# Patient Record
Sex: MALE | Race: WHITE | NOT HISPANIC OR LATINO | Employment: OTHER | URBAN - METROPOLITAN AREA
[De-identification: names, ages, dates, MRNs, and addresses within clinical notes are randomized per-mention and may not be internally consistent; named-entity substitution may affect disease eponyms.]

---

## 2017-08-04 ENCOUNTER — RECORDS - HEALTHEAST (OUTPATIENT)
Dept: LAB | Facility: CLINIC | Age: 67
End: 2017-08-04

## 2017-08-04 LAB
CHOLEST SERPL-MCNC: 214 MG/DL
FASTING STATUS PATIENT QL REPORTED: YES
HDLC SERPL-MCNC: 53 MG/DL
LDLC SERPL CALC-MCNC: 142 MG/DL
TRIGL SERPL-MCNC: 97 MG/DL

## 2018-07-24 ENCOUNTER — RECORDS - HEALTHEAST (OUTPATIENT)
Dept: LAB | Facility: CLINIC | Age: 68
End: 2018-07-24

## 2018-07-24 LAB
ALBUMIN SERPL-MCNC: 3.8 G/DL (ref 3.5–5)
ALP SERPL-CCNC: 75 U/L (ref 45–120)
ALT SERPL W P-5'-P-CCNC: 24 U/L (ref 0–45)
ANION GAP SERPL CALCULATED.3IONS-SCNC: 8 MMOL/L (ref 5–18)
AST SERPL W P-5'-P-CCNC: 27 U/L (ref 0–40)
BILIRUB SERPL-MCNC: 0.7 MG/DL (ref 0–1)
BUN SERPL-MCNC: 20 MG/DL (ref 8–22)
CALCIUM SERPL-MCNC: 9.1 MG/DL (ref 8.5–10.5)
CHLORIDE BLD-SCNC: 108 MMOL/L (ref 98–107)
CHOLEST SERPL-MCNC: 212 MG/DL
CO2 SERPL-SCNC: 28 MMOL/L (ref 22–31)
CREAT SERPL-MCNC: 1.04 MG/DL (ref 0.7–1.3)
FASTING STATUS PATIENT QL REPORTED: YES
GFR SERPL CREATININE-BSD FRML MDRD: >60 ML/MIN/1.73M2
GLUCOSE BLD-MCNC: 99 MG/DL (ref 70–125)
HDLC SERPL-MCNC: 58 MG/DL
LDLC SERPL CALC-MCNC: 139 MG/DL
POTASSIUM BLD-SCNC: 4 MMOL/L (ref 3.5–5)
PROT SERPL-MCNC: 6.4 G/DL (ref 6–8)
SODIUM SERPL-SCNC: 144 MMOL/L (ref 136–145)
TRIGL SERPL-MCNC: 75 MG/DL

## 2019-09-05 ENCOUNTER — HOSPITAL ENCOUNTER (INPATIENT)
Facility: CLINIC | Age: 69
LOS: 3 days | Discharge: ANOTHER HOSPITAL | DRG: 871 | End: 2019-09-08
Attending: EMERGENCY MEDICINE | Admitting: INTERNAL MEDICINE
Payer: MEDICARE

## 2019-09-05 DIAGNOSIS — Z91.89 AT HIGH RISK FOR INFECTION: ICD-10-CM

## 2019-09-05 DIAGNOSIS — M62.81 GENERALIZED MUSCLE WEAKNESS: ICD-10-CM

## 2019-09-05 DIAGNOSIS — R78.81 GRAM-NEGATIVE BACTEREMIA: Primary | ICD-10-CM

## 2019-09-05 DIAGNOSIS — Z95.0 CARDIAC PACEMAKER IN SITU: ICD-10-CM

## 2019-09-05 DIAGNOSIS — R50.9 FEBRILE ILLNESS, ACUTE: ICD-10-CM

## 2019-09-05 DIAGNOSIS — A41.01 MSSA (METHICILLIN SUSCEPTIBLE STAPHYLOCOCCUS AUREUS) SEPTICEMIA (H): ICD-10-CM

## 2019-09-05 DIAGNOSIS — R11.0 NAUSEA: ICD-10-CM

## 2019-09-05 DIAGNOSIS — R50.9 ACUTE FEBRILE ILLNESS: ICD-10-CM

## 2019-09-05 DIAGNOSIS — I33.9 ACUTE ENDOCARDITIS, UNSPECIFIED ENDOCARDITIS TYPE: ICD-10-CM

## 2019-09-05 DIAGNOSIS — R52 PAIN: ICD-10-CM

## 2019-09-05 PROBLEM — E86.0 DEHYDRATION: Status: ACTIVE | Noted: 2019-09-05

## 2019-09-05 PROBLEM — D69.6 THROMBOCYTOPENIA (H): Status: ACTIVE | Noted: 2019-09-05

## 2019-09-05 PROBLEM — M62.82 RHABDOMYOLYSIS: Status: ACTIVE | Noted: 2019-09-05

## 2019-09-05 PROBLEM — E87.1 HYPONATREMIA: Status: ACTIVE | Noted: 2019-09-05

## 2019-09-05 PROBLEM — E87.6 HYPOKALEMIA: Status: ACTIVE | Noted: 2019-09-05

## 2019-09-05 LAB
ALBUMIN SERPL-MCNC: 2.7 G/DL (ref 3.4–5)
ALP SERPL-CCNC: 53 U/L (ref 40–150)
ALT SERPL W P-5'-P-CCNC: 52 U/L (ref 0–70)
ANION GAP SERPL CALCULATED.3IONS-SCNC: 8 MMOL/L (ref 3–14)
AST SERPL W P-5'-P-CCNC: 79 U/L (ref 0–45)
BASOPHILS # BLD AUTO: 0 10E9/L (ref 0–0.2)
BASOPHILS NFR BLD AUTO: 0 %
BILIRUB SERPL-MCNC: 1.4 MG/DL (ref 0.2–1.3)
BUN SERPL-MCNC: 25 MG/DL (ref 7–30)
CALCIUM SERPL-MCNC: 8 MG/DL (ref 8.5–10.1)
CHLORIDE SERPL-SCNC: 91 MMOL/L (ref 94–109)
CK SERPL-CCNC: 1106 U/L (ref 30–300)
CO2 SERPL-SCNC: 28 MMOL/L (ref 20–32)
CREAT SERPL-MCNC: 1.18 MG/DL (ref 0.66–1.25)
CRP SERPL-MCNC: 173 MG/L (ref 0–8)
DIFFERENTIAL METHOD BLD: ABNORMAL
EOSINOPHIL # BLD AUTO: 0 10E9/L (ref 0–0.7)
EOSINOPHIL NFR BLD AUTO: 0 %
ERYTHROCYTE [DISTWIDTH] IN BLOOD BY AUTOMATED COUNT: 12.2 % (ref 10–15)
ETHANOL SERPL-MCNC: <0.01 G/DL
GFR SERPL CREATININE-BSD FRML MDRD: 63 ML/MIN/{1.73_M2}
GLUCOSE SERPL-MCNC: 123 MG/DL (ref 70–99)
HCT VFR BLD AUTO: 35.7 % (ref 40–53)
HGB BLD-MCNC: 12.2 G/DL (ref 13.3–17.7)
LYMPHOCYTES # BLD AUTO: 0.2 10E9/L (ref 0.8–5.3)
LYMPHOCYTES NFR BLD AUTO: 3 %
MAGNESIUM SERPL-MCNC: 1.5 MG/DL (ref 1.6–2.3)
MCH RBC QN AUTO: 31.4 PG (ref 26.5–33)
MCHC RBC AUTO-ENTMCNC: 34.2 G/DL (ref 31.5–36.5)
MCV RBC AUTO: 92 FL (ref 78–100)
METAMYELOCYTES # BLD: 1.1 10E9/L
METAMYELOCYTES NFR BLD MANUAL: 14 %
MONOCYTES # BLD AUTO: 0.2 10E9/L (ref 0–1.3)
MONOCYTES NFR BLD AUTO: 2 %
NEUTROPHILS # BLD AUTO: 6.4 10E9/L (ref 1.6–8.3)
NEUTROPHILS NFR BLD AUTO: 81 %
PLATELET # BLD AUTO: 65 10E9/L (ref 150–450)
PLATELET # BLD EST: ABNORMAL 10*3/UL
POTASSIUM SERPL-SCNC: 3.1 MMOL/L (ref 3.4–5.3)
PROT SERPL-MCNC: 6 G/DL (ref 6.8–8.8)
RBC # BLD AUTO: 3.89 10E12/L (ref 4.4–5.9)
RBC MORPH BLD: ABNORMAL
SODIUM SERPL-SCNC: 127 MMOL/L (ref 133–144)
WBC # BLD AUTO: 7.9 10E9/L (ref 4–11)

## 2019-09-05 PROCEDURE — 87077 CULTURE AEROBIC IDENTIFY: CPT | Performed by: EMERGENCY MEDICINE

## 2019-09-05 PROCEDURE — 99223 1ST HOSP IP/OBS HIGH 75: CPT | Mod: AI | Performed by: INTERNAL MEDICINE

## 2019-09-05 PROCEDURE — 82550 ASSAY OF CK (CPK): CPT | Performed by: EMERGENCY MEDICINE

## 2019-09-05 PROCEDURE — 86618 LYME DISEASE ANTIBODY: CPT | Performed by: EMERGENCY MEDICINE

## 2019-09-05 PROCEDURE — 80320 DRUG SCREEN QUANTALCOHOLS: CPT | Performed by: EMERGENCY MEDICINE

## 2019-09-05 PROCEDURE — 25000132 ZZH RX MED GY IP 250 OP 250 PS 637: Mod: GY | Performed by: EMERGENCY MEDICINE

## 2019-09-05 PROCEDURE — 83735 ASSAY OF MAGNESIUM: CPT | Performed by: EMERGENCY MEDICINE

## 2019-09-05 PROCEDURE — 36415 COLL VENOUS BLD VENIPUNCTURE: CPT | Performed by: EMERGENCY MEDICINE

## 2019-09-05 PROCEDURE — 25000132 ZZH RX MED GY IP 250 OP 250 PS 637: Mod: GY | Performed by: INTERNAL MEDICINE

## 2019-09-05 PROCEDURE — 85025 COMPLETE CBC W/AUTO DIFF WBC: CPT | Performed by: EMERGENCY MEDICINE

## 2019-09-05 PROCEDURE — 99285 EMERGENCY DEPT VISIT HI MDM: CPT

## 2019-09-05 PROCEDURE — 87040 BLOOD CULTURE FOR BACTERIA: CPT | Performed by: EMERGENCY MEDICINE

## 2019-09-05 PROCEDURE — 87186 SC STD MICRODIL/AGAR DIL: CPT | Performed by: EMERGENCY MEDICINE

## 2019-09-05 PROCEDURE — 87207 SMEAR SPECIAL STAIN: CPT | Performed by: EMERGENCY MEDICINE

## 2019-09-05 PROCEDURE — 80053 COMPREHEN METABOLIC PANEL: CPT | Performed by: EMERGENCY MEDICINE

## 2019-09-05 PROCEDURE — 25800030 ZZH RX IP 258 OP 636: Performed by: EMERGENCY MEDICINE

## 2019-09-05 PROCEDURE — 12000000 ZZH R&B MED SURG/OB

## 2019-09-05 PROCEDURE — 25000128 H RX IP 250 OP 636: Performed by: INTERNAL MEDICINE

## 2019-09-05 PROCEDURE — 86140 C-REACTIVE PROTEIN: CPT | Performed by: EMERGENCY MEDICINE

## 2019-09-05 PROCEDURE — 87015 SPECIMEN INFECT AGNT CONCNTJ: CPT | Performed by: EMERGENCY MEDICINE

## 2019-09-05 PROCEDURE — 87800 DETECT AGNT MULT DNA DIREC: CPT | Performed by: EMERGENCY MEDICINE

## 2019-09-05 RX ORDER — AMOXICILLIN 250 MG
2 CAPSULE ORAL 2 TIMES DAILY PRN
Status: DISCONTINUED | OUTPATIENT
Start: 2019-09-05 | End: 2019-09-08 | Stop reason: HOSPADM

## 2019-09-05 RX ORDER — PROCHLORPERAZINE MALEATE 5 MG
5 TABLET ORAL EVERY 6 HOURS PRN
Status: DISCONTINUED | OUTPATIENT
Start: 2019-09-05 | End: 2019-09-08 | Stop reason: HOSPADM

## 2019-09-05 RX ORDER — POTASSIUM CHLORIDE 1500 MG/1
20-40 TABLET, EXTENDED RELEASE ORAL
Status: DISCONTINUED | OUTPATIENT
Start: 2019-09-05 | End: 2019-09-08 | Stop reason: HOSPADM

## 2019-09-05 RX ORDER — POTASSIUM CHLORIDE 1.5 G/1.58G
20-40 POWDER, FOR SOLUTION ORAL
Status: DISCONTINUED | OUTPATIENT
Start: 2019-09-05 | End: 2019-09-08 | Stop reason: HOSPADM

## 2019-09-05 RX ORDER — POTASSIUM CHLORIDE 1500 MG/1
40 TABLET, EXTENDED RELEASE ORAL ONCE
Status: COMPLETED | OUTPATIENT
Start: 2019-09-05 | End: 2019-09-05

## 2019-09-05 RX ORDER — LIDOCAINE 40 MG/G
CREAM TOPICAL
Status: DISCONTINUED | OUTPATIENT
Start: 2019-09-05 | End: 2019-09-08 | Stop reason: HOSPADM

## 2019-09-05 RX ORDER — OLMESARTAN MEDOXOMIL AND HYDROCHLOROTHIAZIDE 20/12.5 20; 12.5 MG/1; MG/1
1 TABLET ORAL DAILY
Status: ON HOLD | COMMUNITY
End: 2019-09-08

## 2019-09-05 RX ORDER — ACETAMINOPHEN 325 MG/1
650 TABLET ORAL EVERY 4 HOURS PRN
Status: DISCONTINUED | OUTPATIENT
Start: 2019-09-05 | End: 2019-09-08 | Stop reason: HOSPADM

## 2019-09-05 RX ORDER — ACETAMINOPHEN 500 MG
1000 TABLET ORAL EVERY 6 HOURS PRN
Status: ON HOLD | COMMUNITY
End: 2019-09-08

## 2019-09-05 RX ORDER — BISACODYL 10 MG
10 SUPPOSITORY, RECTAL RECTAL DAILY PRN
Status: DISCONTINUED | OUTPATIENT
Start: 2019-09-05 | End: 2019-09-08 | Stop reason: HOSPADM

## 2019-09-05 RX ORDER — ACETAMINOPHEN 500 MG
1000 TABLET ORAL ONCE
Status: COMPLETED | OUTPATIENT
Start: 2019-09-05 | End: 2019-09-05

## 2019-09-05 RX ORDER — POTASSIUM CHLORIDE 29.8 MG/ML
20 INJECTION INTRAVENOUS
Status: DISCONTINUED | OUTPATIENT
Start: 2019-09-05 | End: 2019-09-08 | Stop reason: HOSPADM

## 2019-09-05 RX ORDER — NALOXONE HYDROCHLORIDE 0.4 MG/ML
.1-.4 INJECTION, SOLUTION INTRAMUSCULAR; INTRAVENOUS; SUBCUTANEOUS
Status: DISCONTINUED | OUTPATIENT
Start: 2019-09-05 | End: 2019-09-08 | Stop reason: HOSPADM

## 2019-09-05 RX ORDER — ONDANSETRON 2 MG/ML
4 INJECTION INTRAMUSCULAR; INTRAVENOUS EVERY 6 HOURS PRN
Status: DISCONTINUED | OUTPATIENT
Start: 2019-09-05 | End: 2019-09-08 | Stop reason: HOSPADM

## 2019-09-05 RX ORDER — SODIUM CHLORIDE 9 MG/ML
INJECTION, SOLUTION INTRAVENOUS CONTINUOUS
Status: DISCONTINUED | OUTPATIENT
Start: 2019-09-05 | End: 2019-09-05

## 2019-09-05 RX ORDER — PROCHLORPERAZINE 25 MG
12.5 SUPPOSITORY, RECTAL RECTAL EVERY 12 HOURS PRN
Status: DISCONTINUED | OUTPATIENT
Start: 2019-09-05 | End: 2019-09-08 | Stop reason: HOSPADM

## 2019-09-05 RX ORDER — POTASSIUM CL/LIDO/0.9 % NACL 10MEQ/0.1L
10 INTRAVENOUS SOLUTION, PIGGYBACK (ML) INTRAVENOUS
Status: DISCONTINUED | OUTPATIENT
Start: 2019-09-05 | End: 2019-09-08 | Stop reason: HOSPADM

## 2019-09-05 RX ORDER — ONDANSETRON 4 MG/1
4 TABLET, ORALLY DISINTEGRATING ORAL EVERY 6 HOURS PRN
Status: DISCONTINUED | OUTPATIENT
Start: 2019-09-05 | End: 2019-09-08 | Stop reason: HOSPADM

## 2019-09-05 RX ORDER — MAGNESIUM SULFATE HEPTAHYDRATE 40 MG/ML
4 INJECTION, SOLUTION INTRAVENOUS EVERY 4 HOURS PRN
Status: DISCONTINUED | OUTPATIENT
Start: 2019-09-05 | End: 2019-09-08 | Stop reason: HOSPADM

## 2019-09-05 RX ORDER — POTASSIUM CHLORIDE 7.45 MG/ML
10 INJECTION INTRAVENOUS
Status: DISCONTINUED | OUTPATIENT
Start: 2019-09-05 | End: 2019-09-08 | Stop reason: HOSPADM

## 2019-09-05 RX ORDER — SODIUM CHLORIDE AND POTASSIUM CHLORIDE 150; 900 MG/100ML; MG/100ML
INJECTION, SOLUTION INTRAVENOUS CONTINUOUS
Status: DISCONTINUED | OUTPATIENT
Start: 2019-09-05 | End: 2019-09-07

## 2019-09-05 RX ORDER — AMOXICILLIN 250 MG
1 CAPSULE ORAL 2 TIMES DAILY PRN
Status: DISCONTINUED | OUTPATIENT
Start: 2019-09-05 | End: 2019-09-08 | Stop reason: HOSPADM

## 2019-09-05 RX ADMIN — POTASSIUM CHLORIDE AND SODIUM CHLORIDE: 900; 150 INJECTION, SOLUTION INTRAVENOUS at 22:35

## 2019-09-05 RX ADMIN — ACETAMINOPHEN 1000 MG: 500 TABLET, FILM COATED ORAL at 18:49

## 2019-09-05 RX ADMIN — SODIUM CHLORIDE: 9 INJECTION, SOLUTION INTRAVENOUS at 18:49

## 2019-09-05 RX ADMIN — MAGNESIUM SULFATE HEPTAHYDRATE 4 G: 40 INJECTION, SOLUTION INTRAVENOUS at 23:16

## 2019-09-05 RX ADMIN — ACETAMINOPHEN 650 MG: 325 TABLET, FILM COATED ORAL at 23:17

## 2019-09-05 RX ADMIN — POTASSIUM CHLORIDE 20 MEQ: 1500 TABLET, EXTENDED RELEASE ORAL at 22:36

## 2019-09-05 RX ADMIN — POTASSIUM CHLORIDE 40 MEQ: 1500 TABLET, EXTENDED RELEASE ORAL at 18:49

## 2019-09-05 ASSESSMENT — ENCOUNTER SYMPTOMS
NAUSEA: 1
ABDOMINAL PAIN: 0
VOMITING: 0
FEVER: 1
APPETITE CHANGE: 1
FATIGUE: 1
DIARRHEA: 0
DIAPHORESIS: 1
MYALGIAS: 1
DIFFICULTY URINATING: 1
ABDOMINAL DISTENTION: 1
WEAKNESS: 1
CHILLS: 1
ROS SKIN COMMENTS: INSECT BITE
COUGH: 0

## 2019-09-05 ASSESSMENT — ACTIVITIES OF DAILY LIVING (ADL)
BATHING: 0-->INDEPENDENT
TOILETING: 0-->INDEPENDENT
RETIRED_EATING: 0-->INDEPENDENT
DRESS: 0-->INDEPENDENT
SWALLOWING: 0-->SWALLOWS FOODS/LIQUIDS WITHOUT DIFFICULTY
RETIRED_COMMUNICATION: 0-->UNDERSTANDS/COMMUNICATES WITHOUT DIFFICULTY
AMBULATION: 0-->INDEPENDENT
WHICH_OF_THE_ABOVE_FUNCTIONAL_RISKS_HAD_A_RECENT_ONSET_OR_CHANGE?: AMBULATION;TRANSFERRING
TRANSFERRING: 0-->INDEPENDENT
COGNITION: 0 - NO COGNITION ISSUES REPORTED
FALL_HISTORY_WITHIN_LAST_SIX_MONTHS: NO

## 2019-09-05 ASSESSMENT — MIFFLIN-ST. JEOR: SCORE: 1665.76

## 2019-09-05 NOTE — ED TRIAGE NOTES
"Pt reports fatigue, chills, nausea, and bilateral leg pain since Tuesday, pt states he is unable to walk d/t pain in his legs. States his legs \"give out\". Went to urgency room yesterday with no answers. Voiding less than normal. Appetite very poor, sleeping more than usual   "

## 2019-09-05 NOTE — ED NOTES
"[facility]  ED Nurse Handoff Report    Eh Srivastava is a 69 year old male   ED Chief complaint: Fatigue  . ED Diagnosis:   Final diagnoses:   Acute febrile illness   Generalized muscle weakness     Allergies:   Allergies   Allergen Reactions     Amlodipine Shortness Of Breath     Atenolol Shortness Of Breath     Dofetilide Other (See Comments)     Prolonged QT     Lisinopril Cough     Losartan Other (See Comments)     Weight gain     Furosemide Rash     Metoprolol Rash       Code Status: Full Code  Activity level - Baseline/Home:  Independent. Activity Level - Current:   Assist X 1. Lift room needed: No. Bariatric: No   Needed: No   Isolation: No. Infection: Not Applicable.     Vital Signs:   Vitals:    09/05/19 1504 09/05/19 1715 09/05/19 1730   BP: 101/60 102/53 104/64   Pulse: 91  68   Resp: 18     Temp: 98.8  F (37.1  C)     TempSrc: Temporal     SpO2: 95%  98%   Weight: 88.5 kg (195 lb)     Height: 1.778 m (5' 10\")         Cardiac Rhythm:  ,      Pain level: 0-10 Pain Scale: 5  Patient confused: No. Patient Falls Risk: Yes.   Elimination Status: Has voided   Patient Report - Initial Complaint:    Pt reports fatigue, chills, nausea, and bilateral leg pain since Tuesday, pt states he is unable to walk d/t pain in his legs. States his legs \"give out\". Went to urgency room yesterday with no answers. Voiding less than normal. Appetite very poor, sleeping more than usual      . Focused Assessment:   17:00 Musculoskeletal Musculoskeletal - Musculoskeletal WDL: -WDL except  General Mobility: generalized weakness  CMS Intact: Yes  Musculoskeletal Comment: complaints of generalized weakness, joint pain, low grade fevers with chills  KB     17:00 Skin Color/Condition Skin - Skin WDL: -WDL except  Skin Comment: about 3cm red ring-like rash to left shin; recently working in a wood pile outside          Tests Performed: labs- send out labs will return tomorrrow. Abnormal Results:   Labs Ordered " and Resulted from Time of ED Arrival Up to the Time of Departure from the ED   CBC WITH PLATELETS DIFFERENTIAL - Abnormal; Notable for the following components:       Result Value    RBC Count 3.89 (*)     Hemoglobin 12.2 (*)     Hematocrit 35.7 (*)     Platelet Count 65 (*)     Absolute Lymphocytes 0.2 (*)     Absolute Metamyelocytes 1.1 (*)     All other components within normal limits   COMPREHENSIVE METABOLIC PANEL - Abnormal; Notable for the following components:    Sodium 127 (*)     Potassium 3.1 (*)     Chloride 91 (*)     Glucose 123 (*)     Calcium 8.0 (*)     Bilirubin Total 1.4 (*)     Albumin 2.7 (*)     Protein Total 6.0 (*)     AST 79 (*)     All other components within normal limits   CK TOTAL - Abnormal; Notable for the following components:    CK Total 1,106 (*)     All other components within normal limits   CRP INFLAMMATION - Abnormal; Notable for the following components:    CRP Inflammation 173.0 (*)     All other components within normal limits   ALCOHOL ETHYL   LYME DISEASE ADRIEL WITH REFLEX TO WB SERUM   PERIPHERAL IV CATHETER   BLOOD CULTURE   BLOOD CULTURE   PARASITE STAIN     .   Treatments provided: IV fluids, potassium replacement, tylenol,- consulted with infectious disease  Family Comments: wife and daughter at bedside  OBS brochure/video discussed/provided to patient:  Yes  ED Medications:   Medications   sodium chloride 0.9% infusion ( Intravenous New Bag 9/5/19 1849)   potassium chloride ER (K-DUR/KLOR-CON M) CR tablet 40 mEq (40 mEq Oral Given 9/5/19 1849)   acetaminophen (TYLENOL) tablet 1,000 mg (1,000 mg Oral Given 9/5/19 1849)     Drips infusing:  Yes  For the majority of the shift, the patient's behavior Green. Interventions performed were none.     Severe Sepsis OR Septic Shock Diagnosis Present: No      ED Nurse Name/Phone Number: Oriana Lopez RN,   6:53 PM    RECEIVING UNIT ED HANDOFF REVIEW    Above ED Nurse Handoff Report was reviewed: Yes  Reviewed by: Mignon Jeffries RN  on September 5, 2019 at 8:58 PM

## 2019-09-06 ENCOUNTER — APPOINTMENT (OUTPATIENT)
Dept: CARDIOLOGY | Facility: CLINIC | Age: 69
DRG: 871 | End: 2019-09-06
Attending: INTERNAL MEDICINE
Payer: MEDICARE

## 2019-09-06 ENCOUNTER — APPOINTMENT (OUTPATIENT)
Dept: PHYSICAL THERAPY | Facility: CLINIC | Age: 69
DRG: 871 | End: 2019-09-06
Attending: INTERNAL MEDICINE
Payer: MEDICARE

## 2019-09-06 LAB
ANION GAP SERPL CALCULATED.3IONS-SCNC: 6 MMOL/L (ref 3–14)
B BURGDOR IGG+IGM SER QL: 0.04 (ref 0–0.89)
BASOPHILS # BLD AUTO: 0 10E9/L (ref 0–0.2)
BASOPHILS NFR BLD AUTO: 0 %
BUN SERPL-MCNC: 26 MG/DL (ref 7–30)
CALCIUM SERPL-MCNC: 7.4 MG/DL (ref 8.5–10.1)
CHLORIDE SERPL-SCNC: 95 MMOL/L (ref 94–109)
CK SERPL-CCNC: 894 U/L (ref 30–300)
CO2 SERPL-SCNC: 27 MMOL/L (ref 20–32)
COPATH REPORT: NORMAL
CREAT SERPL-MCNC: 1.17 MG/DL (ref 0.66–1.25)
DIFFERENTIAL METHOD BLD: ABNORMAL
EOSINOPHIL # BLD AUTO: 0 10E9/L (ref 0–0.7)
EOSINOPHIL NFR BLD AUTO: 0 %
ERYTHROCYTE [DISTWIDTH] IN BLOOD BY AUTOMATED COUNT: 12.3 % (ref 10–15)
GFR SERPL CREATININE-BSD FRML MDRD: 64 ML/MIN/{1.73_M2}
GLUCOSE SERPL-MCNC: 127 MG/DL (ref 70–99)
HCT VFR BLD AUTO: 35.5 % (ref 40–53)
HGB BLD-MCNC: 12.4 G/DL (ref 13.3–17.7)
LACTATE BLD-SCNC: 3 MMOL/L (ref 0.7–2)
LACTATE BLD-SCNC: 3.1 MMOL/L (ref 0.7–2)
LACTATE BLD-SCNC: 4.8 MMOL/L (ref 0.7–2)
LYMPHOCYTES # BLD AUTO: 0 10E9/L (ref 0.8–5.3)
LYMPHOCYTES NFR BLD AUTO: 1 %
MAGNESIUM SERPL-MCNC: 2.5 MG/DL (ref 1.6–2.3)
MCH RBC QN AUTO: 31.6 PG (ref 26.5–33)
MCHC RBC AUTO-ENTMCNC: 34.9 G/DL (ref 31.5–36.5)
MCV RBC AUTO: 90 FL (ref 78–100)
METAMYELOCYTES # BLD: 0.1 10E9/L
METAMYELOCYTES NFR BLD MANUAL: 2 %
MONOCYTES # BLD AUTO: 0.3 10E9/L (ref 0–1.3)
MONOCYTES NFR BLD AUTO: 7 %
NEUTROPHILS # BLD AUTO: 4.1 10E9/L (ref 1.6–8.3)
NEUTROPHILS NFR BLD AUTO: 90 %
PARASITE SPEC INSPECT: NORMAL
PLATELET # BLD AUTO: 50 10E9/L (ref 150–450)
POTASSIUM SERPL-SCNC: 3.4 MMOL/L (ref 3.4–5.3)
POTASSIUM SERPL-SCNC: 3.6 MMOL/L (ref 3.4–5.3)
RBC # BLD AUTO: 3.93 10E12/L (ref 4.4–5.9)
RETICS # AUTO: 47.6 10E9/L (ref 25–95)
RETICS/RBC NFR AUTO: 1.2 % (ref 0.5–2)
SODIUM SERPL-SCNC: 128 MMOL/L (ref 133–144)
SPECIMEN SOURCE: NORMAL
WBC # BLD AUTO: 4.6 10E9/L (ref 4–11)

## 2019-09-06 PROCEDURE — 80048 BASIC METABOLIC PNL TOTAL CA: CPT | Performed by: INTERNAL MEDICINE

## 2019-09-06 PROCEDURE — 97162 PT EVAL MOD COMPLEX 30 MIN: CPT | Mod: GP

## 2019-09-06 PROCEDURE — 83735 ASSAY OF MAGNESIUM: CPT | Performed by: INTERNAL MEDICINE

## 2019-09-06 PROCEDURE — 36415 COLL VENOUS BLD VENIPUNCTURE: CPT | Performed by: INTERNAL MEDICINE

## 2019-09-06 PROCEDURE — 99233 SBSQ HOSP IP/OBS HIGH 50: CPT | Performed by: INTERNAL MEDICINE

## 2019-09-06 PROCEDURE — 93306 TTE W/DOPPLER COMPLETE: CPT | Mod: 26 | Performed by: INTERNAL MEDICINE

## 2019-09-06 PROCEDURE — 25000128 H RX IP 250 OP 636: Performed by: INTERNAL MEDICINE

## 2019-09-06 PROCEDURE — 12000000 ZZH R&B MED SURG/OB

## 2019-09-06 PROCEDURE — 97530 THERAPEUTIC ACTIVITIES: CPT | Mod: GP

## 2019-09-06 PROCEDURE — 83605 ASSAY OF LACTIC ACID: CPT | Performed by: INTERNAL MEDICINE

## 2019-09-06 PROCEDURE — 25000132 ZZH RX MED GY IP 250 OP 250 PS 637: Mod: GY | Performed by: INTERNAL MEDICINE

## 2019-09-06 PROCEDURE — 87077 CULTURE AEROBIC IDENTIFY: CPT | Performed by: INTERNAL MEDICINE

## 2019-09-06 PROCEDURE — 85045 AUTOMATED RETICULOCYTE COUNT: CPT | Performed by: INTERNAL MEDICINE

## 2019-09-06 PROCEDURE — 84132 ASSAY OF SERUM POTASSIUM: CPT | Performed by: INTERNAL MEDICINE

## 2019-09-06 PROCEDURE — 85060 BLOOD SMEAR INTERPRETATION: CPT | Performed by: INTERNAL MEDICINE

## 2019-09-06 PROCEDURE — 87040 BLOOD CULTURE FOR BACTERIA: CPT | Performed by: INTERNAL MEDICINE

## 2019-09-06 PROCEDURE — 82550 ASSAY OF CK (CPK): CPT | Performed by: INTERNAL MEDICINE

## 2019-09-06 PROCEDURE — 93306 TTE W/DOPPLER COMPLETE: CPT

## 2019-09-06 PROCEDURE — 85004 AUTOMATED DIFF WBC COUNT: CPT | Performed by: INTERNAL MEDICINE

## 2019-09-06 PROCEDURE — 25800030 ZZH RX IP 258 OP 636: Performed by: INTERNAL MEDICINE

## 2019-09-06 PROCEDURE — 85027 COMPLETE CBC AUTOMATED: CPT | Performed by: INTERNAL MEDICINE

## 2019-09-06 PROCEDURE — 40000847 ZZHCL STATISTIC MORPHOLOGY W/INTERP HISTOLOGY TC 85060: Performed by: INTERNAL MEDICINE

## 2019-09-06 RX ORDER — CEFTRIAXONE 2 G/1
2 INJECTION, POWDER, FOR SOLUTION INTRAMUSCULAR; INTRAVENOUS EVERY 24 HOURS
Status: DISCONTINUED | OUTPATIENT
Start: 2019-09-06 | End: 2019-09-06

## 2019-09-06 RX ORDER — CEFAZOLIN SODIUM 1 G/50ML
1750 SOLUTION INTRAVENOUS EVERY 12 HOURS
Status: DISCONTINUED | OUTPATIENT
Start: 2019-09-06 | End: 2019-09-06

## 2019-09-06 RX ORDER — CEFAZOLIN SODIUM 2 G/100ML
2 INJECTION, SOLUTION INTRAVENOUS EVERY 8 HOURS
Status: DISCONTINUED | OUTPATIENT
Start: 2019-09-06 | End: 2019-09-08 | Stop reason: HOSPADM

## 2019-09-06 RX ADMIN — CEFAZOLIN SODIUM 2 G: 2 INJECTION, SOLUTION INTRAVENOUS at 20:31

## 2019-09-06 RX ADMIN — ACETAMINOPHEN 650 MG: 325 TABLET, FILM COATED ORAL at 11:49

## 2019-09-06 RX ADMIN — ACETAMINOPHEN 650 MG: 325 TABLET, FILM COATED ORAL at 03:51

## 2019-09-06 RX ADMIN — CEFTRIAXONE SODIUM 2 G: 2 INJECTION, POWDER, FOR SOLUTION INTRAMUSCULAR; INTRAVENOUS at 04:45

## 2019-09-06 RX ADMIN — ACETAMINOPHEN 650 MG: 325 TABLET, FILM COATED ORAL at 20:39

## 2019-09-06 RX ADMIN — VANCOMYCIN HYDROCHLORIDE 1750 MG: 10 INJECTION, POWDER, LYOPHILIZED, FOR SOLUTION INTRAVENOUS at 06:18

## 2019-09-06 RX ADMIN — POTASSIUM CHLORIDE AND SODIUM CHLORIDE: 900; 150 INJECTION, SOLUTION INTRAVENOUS at 23:20

## 2019-09-06 RX ADMIN — SODIUM CHLORIDE, POTASSIUM CHLORIDE, SODIUM LACTATE AND CALCIUM CHLORIDE 1000 ML: 600; 310; 30; 20 INJECTION, SOLUTION INTRAVENOUS at 16:01

## 2019-09-06 RX ADMIN — CEFAZOLIN SODIUM 2 G: 2 INJECTION, SOLUTION INTRAVENOUS at 11:49

## 2019-09-06 RX ADMIN — SODIUM CHLORIDE 1000 ML: 9 INJECTION, SOLUTION INTRAVENOUS at 01:11

## 2019-09-06 RX ADMIN — SODIUM CHLORIDE 1000 ML: 9 INJECTION, SOLUTION INTRAVENOUS at 20:31

## 2019-09-06 ASSESSMENT — ACTIVITIES OF DAILY LIVING (ADL)
ADLS_ACUITY_SCORE: 17
ADLS_ACUITY_SCORE: 14
ADLS_ACUITY_SCORE: 17
ADLS_ACUITY_SCORE: 13
ADLS_ACUITY_SCORE: 17
ADLS_ACUITY_SCORE: 17

## 2019-09-06 NOTE — CONSULTS
ID consult dictated IMP 1 70 yo male acute illness, MSSA bacteremia, MV, pacemaker, back pain, bilat shoulder pain all concerns    REC 1 ancef, serial Bc, TTEE if we can if not TTE and RADHA next week, monitor pain, low threshold ortho see

## 2019-09-06 NOTE — PLAN OF CARE
VS-pt came to the floor around 2130, low grade temp of 100.1  Lung Sounds-clear, no cough, on room air, using IS upto 1500  O2-on room air.   GI-+bs, +flatus, had bm at home earlier today. On reg diet, did not eat after arrival this shift. Hx of poor appetite the last 2-3 days. Intermittent nausea, no vomiting.   -due to void. Family stated it was last void at 1330 at home.   IVF-@ 100,   Dressings-none.   CMS-denies numbness and tingling. +pp, strong dorsi/planter flexion. Strong hand grasp. Scds on while in bed.   Drain-none  Activity-bedrest, pt has not been up yet. Due to void.   Pain-rates pain level was a 5, generalized pain/discomfort in thighs, shoulders, back.   D/C Plan-home when able.   K was 3.1--replaced this shift. Recheck in 4 hours. Mg level also 1.5--needs replacement. Called pharmacy

## 2019-09-06 NOTE — H&P
[facility]  History and Physical   Hospitalist Service    Vicente Longo MD    Eh Srivastava MRN# [id number]   YOB: 1950 Age: 69 year old      Date of Admission:  9/5/2019           Assessment and Plan:   Eh Srivastava is a 69-year-old male with history of hypertension, hernia, heart murmur, atrial fibrillation, pacemaker placement, hernia surgery, tonsillectomy, mitral valve repair, and mandible surgery.  He presented to the emergency department for evaluation of fever, fatigue, diffuse myalgias, and poor oral intake.  These symptoms have been progressive over the last 2 days.  He was seen in the Urgency Room yesterday.  He had evaluation that showed largely unremarkable labs including basic metabolic panel, CBC (platelets were low at 85), and urinalysis.  He discharged home.  He continued to feel poorly so came to the Emergency Room tonight.  Work-up tonight showed stable vital signs.  Exam suggested some mild confusion or slowness to answer questions but nonfocal exam.  His family has noted a what looks like an insect bite on his left medial lower leg. No tick was noted. Eh is outside often doing yard work. No recent travel noted. Laboratory evaluation showed worsening thrombocytopenia with platelet count of 65, hyponatremia with sodium of 127, hypokalemia with potassium 3.1, rhabdomyolysis with CK of 1106, with an elevated C-reactive protein of 173.  Creatinine was also higher than yesterday at 1.18.  Infectious disease was consulted in the ED by phone and recommended checking a parasite smear for ehrlichiosis and babesiosis as well as a lyme screen.  I was asked to admit Eh to the hospital with febrile illness for which he is failing outpatient management with worsening labs and dehydration.    Problem list:    1.  Febrile illness with associated fatigue, diffuse myalgias, decreased oral intake, weakness, and possible insect bite.  His laboratory abnormalities including  thrombocytopenia, hyponatremia, hypokalemia, rhabdomyolysis, and elevated C-reactive protein.  He also seems to have mild infectious and metabolic encephalopathy with some confusion or slowness to answer questions.  Etiology is uncertain.  This could be viral.  I agree that tickborne illness should be assessed for.  Blood cultures are pending.  Chest x-ray and urinalysis were obtained in the urgency room yesterday and were unremarkable so will not be repeated.  I am not prescribing empiric antibiotics at this time.  Plan on supportive care with IV fluid hydration, electrolyte replacement, analgesics as needed, and antipyretics as needed. PT will be consulted.    2.  Dehydration with rising creatinine.  Hydrate with normal saline with 20 mCi of potassium per liter at 100 cc/h.  Reassess tomorrow.  Repeat basic metabolic panel in the morning.    3.  Hyponatremia.  This is acute.  I suspect that this is due to poor oral intake except for water and some Gatorade.  Monitor sodium with normal saline hydration.    4.  Hypokalemia.  I suspect that this is related to decreased oral intake.  Replace per protocol.    5.  Mild rhabdomyolysis.  I suspect that this is due to being bedridden for the last 2 days.  Hydrate with normal saline.  Repeat CK tomorrow.    6.  Acute kidney injury, mild.  This is likely due to dehydration and less likely rhabdomyolysis.  Monitor renal function with IV fluid hydration.    7.  Thrombocytopenia, worsening.  Suspect related to febrile illness.  I will check a peripheral smear, retic count, and CBC in the am.  I am avoiding anticoagulants for DVT prophylaxis. Hold PTA Elliquis for now.     8. Infectious and metabolic encephalopathy.    Full code  Mechanical DVT prophylaxis; Hold PTA Elliquis  Disposition: Admit as inpatient as he has febrile illness that is failed outpatient treatment and has progressive worsening of labs as discussed above.           Code Status:   Full Code         Primary  Care Physician:   Cristi Cherry [phone]         Chief Complaint:   Fatigue, fever, myalgias, and possible insect bite    History is obtained from Eh, his wife and daughter, Dr. Mercedes, and the medical record         History of Present Illness:   Eh Srivastava is a 69-year-old male with history of hypertension, hernia, heart murmur, atrial fibrillation, pacemaker placement, hernia surgery, tonsillectomy, mitral valve repair, and mandible surgery.  He presented to the emergency department for evaluation of fever, fatigue, diffuse myalgias, and poor oral intake.  These symptoms have been progressive over the last 2 days.  He was seen in the Urgency Room yesterday.  He had evaluation that showed largely unremarkable labs including basic metabolic panel, CBC (platelets were low at 85), and urinalysis.  He discharged home.  He continued to feel poorly so came to the Emergency Room tonight.  Work-up tonight showed stable vital signs.  Exam suggested some mild confusion or slowness to answer questions but nonfocal exam.  His family has noted a what looks like an insect bite on his left medial lower leg. No tick was noted. Eh is outside often doing yard work. No recent travel noted. Laboratory evaluation showed worsening thrombocytopenia with platelet count of 65, hyponatremia with sodium of 127, hypokalemia with potassium 3.1, rhabdomyolysis with CK of 1106, with an elevated C-reactive protein of 173.  Creatinine was also higher than yesterday at 1.18.  Infectious disease was consulted in the ED by phone and recommended checking a parasite smear for ehrlichiosis and babesiosis as well as a lyme screen.  I was asked to admit Eh to the hospital with febrile illness for which he is failing outpatient management with worsening labs and dehydration.           Past Medical History:     Patient Active Problem List   Diagnosis     Febrile illness, acute     Thrombocytopenia (H)     Hyponatremia     Hypokalemia      "Rhabdomyolysis     Dehydration      Past Medical History:   Diagnosis Date     Arrhythmia     a fib Oct 2010     Heart murmur      Hernia      Hypertension              Past Surgical History:     Past Surgical History:   Procedure Laterality Date     CARDIAC SURGERY      mitrol valve repair     ENT SURGERY      Tonsillectomy     HERNIORRHAPHY INGUINAL  2014    Procedure: HERNIORRHAPHY INGUINAL;  Surgeon: Cristi Garcia MD;  Location: [facility] OR     MANDIBLE SURGERY              Home Medications:     Prior to Admission medications    Medication Sig Last Dose Taking? Auth Provider   aspirin 325 MG tablet Take by mouth daily   Reported, Patient   losartan (COZAAR) 25 MG tablet Take 25 mg by mouth daily   Reported, Patient   oxyCODONE (ROXICODONE) 5 MG immediate release tablet Take 1-2 tablets (5-10 mg) by mouth every 3 hours as needed for pain or other (Moderate to Severe)   Cristi Garcia MD            Allergies:     Allergies   Allergen Reactions     Amlodipine Shortness Of Breath     Atenolol Shortness Of Breath     Dofetilide Other (See Comments)     Prolonged QT     Lisinopril Cough     Losartan Other (See Comments)     Weight gain     Furosemide Rash     Metoprolol Rash            Social History:     Social History     Tobacco Use     Smoking status: Former Smoker     Last attempt to quit: 1983     Years since quittin.7     Smokeless tobacco: Never Used   Substance Use Topics     Alcohol use: No     Comment: quit              Family History:     Family History   Problem Relation Age of Onset     Cancer Mother      Thyroid Disease Mother               Review of Systems:   The 10 point Review of Systems is negative other than as noted in the HPI.           Physical Exam:   Blood pressure 105/59, pulse 77, temperature 100.1  F (37.8  C), temperature source Oral, resp. rate 28, height 1.778 m (5' 10\"), weight 88.5 kg (195 lb), SpO2 95 %.  195 lbs 0 oz      GENERAL: Pleasant and cooperative. No " acute distress. Some slowness in answering questions.   EYES: Pupils equal and round. No scleral erythema or icterus.  ENT: External ears are normal without deformity. Posterior oropharynx is without erythem, swelling, or exudate.  NECK: Supple. No masses or swelling. No tenderness. Thyroid is normal without mass or tenderness.  CHEST: Clear to auscultation. Normal breath sounds. No retractions.   CV: Regular rate and rhythm. No JVD. Pulses normal.  ABDOMEN: Bowel sounds present. No tenderness. No masses or hernia.  EXTREMETIES: No clubbing, cyanosis, or ischemia.  SKIN: Warm and dry to touch. No wounds or rashes. Possible insect bite on left anteromedial lower leg. NEUROLOGIC: Strength and sensation are normal. Deep tendon reflexes are normal. Cranial nerves are normal.             Data:   All new lab and imaging data was reviewed.     Results for orders placed or performed during the hospital encounter of 09/05/19 (from the past 24 hour(s))   CBC with platelets differential   Result Value Ref Range    WBC 7.9 4.0 - 11.0 10e9/L    RBC Count 3.89 (L) 4.4 - 5.9 10e12/L    Hemoglobin 12.2 (L) 13.3 - 17.7 g/dL    Hematocrit 35.7 (L) 40.0 - 53.0 %    MCV 92 78 - 100 fl    MCH 31.4 26.5 - 33.0 pg    MCHC 34.2 31.5 - 36.5 g/dL    RDW 12.2 10.0 - 15.0 %    Platelet Count 65 (L) 150 - 450 10e9/L    Diff Method Manual Differential     % Neutrophils 81.0 %    % Lymphocytes 3.0 %    % Monocytes 2.0 %    % Eosinophils 0.0 %    % Basophils 0.0 %    % Metamyelocytes 14.0 %    Absolute Neutrophil 6.4 1.6 - 8.3 10e9/L    Absolute Lymphocytes 0.2 (L) 0.8 - 5.3 10e9/L    Absolute Monocytes 0.2 0.0 - 1.3 10e9/L    Absolute Eosinophils 0.0 0.0 - 0.7 10e9/L    Absolute Basophils 0.0 0.0 - 0.2 10e9/L    Absolute Metamyelocytes 1.1 (H) 0 10e9/L    RBC Morphology Consistent with reported results     Platelet Estimate       Automated count confirmed.  Platelet morphology is normal.   Comprehensive metabolic panel   Result Value Ref Range     Sodium 127 (L) 133 - 144 mmol/L    Potassium 3.1 (L) 3.4 - 5.3 mmol/L    Chloride 91 (L) 94 - 109 mmol/L    Carbon Dioxide 28 20 - 32 mmol/L    Anion Gap 8 3 - 14 mmol/L    Glucose 123 (H) 70 - 99 mg/dL    Urea Nitrogen 25 7 - 30 mg/dL    Creatinine 1.18 0.66 - 1.25 mg/dL    GFR Estimate 63 >60 mL/min/[1.73_m2]    GFR Estimate If Black 73 >60 mL/min/[1.73_m2]    Calcium 8.0 (L) 8.5 - 10.1 mg/dL    Bilirubin Total 1.4 (H) 0.2 - 1.3 mg/dL    Albumin 2.7 (L) 3.4 - 5.0 g/dL    Protein Total 6.0 (L) 6.8 - 8.8 g/dL    Alkaline Phosphatase 53 40 - 150 U/L    ALT 52 0 - 70 U/L    AST 79 (H) 0 - 45 U/L   CK total   Result Value Ref Range    CK Total 1,106 (HH) 30 - 300 U/L   CRP inflammation   Result Value Ref Range    CRP Inflammation 173.0 (H) 0.0 - 8.0 mg/L   Alcohol level blood   Result Value Ref Range    Ethanol g/dL <0.01 <0.01 g/dL   Blood culture   Result Value Ref Range    Specimen Description Blood Left Arm     Special Requests Aerobic and anaerobic bottles received     Culture Micro No growth after 1 hour    Blood culture   Result Value Ref Range    Specimen Description Blood     Special Requests Right Arm     Culture Micro No growth after 1 hour

## 2019-09-06 NOTE — PROGRESS NOTES
09/06/19 1400   Quick Adds   Type of Visit Initial PT Evaluation   Living Environment   Lives With spouse;child(juan carlos), adult  (daughter)   Living Arrangements house   Home Accessibility stairs to enter home   Number of Stairs, Main Entrance 4   Stair Railings, Main Entrance railing on right side (ascending)   Living Environment Comment All needs met main level; Bathroom set up includes tub shower with bench, standard height toilet.   Self-Care   Usual Activity Tolerance good   Current Activity Tolerance poor   Activity/Exercise/Self-Care Comment Does own walker (may be too short- was wife's from prior surgery) and cane   Functional Level Prior   Ambulation 0-->independent   Transferring 0-->independent   Toileting 0-->independent   Bathing 0-->independent   Fall history within last six months no   Which of the above functional risks had a recent onset or change? ambulation;transferring;toileting;bathing;dressing   General Information   Onset of Illness/Injury or Date of Surgery - Date 09/05/19   Referring Physician Vicente Longo MD   Patient/Family Goals Statement to regain strength/ mobility   Pertinent History of Current Problem (include personal factors and/or comorbidities that impact the POC)  Pt is 69 y.o. M initially presented to ED 9/4 for evaluation of fever, fatigue, diffuse myalgias, and poor oral intake- was evaluated and sent home but had continued symptoms and returned to ED 9/5. Family noted insect bite on pt's lower leg. Pt admitted 9/5 for febrile illness with worsening labs and dehydration, now noted to have sepsis with bacteremia.    Precautions/Limitations fall precautions   Cognitive Status Examination   Orientation orientation to person, place and time   Level of Consciousness alert   Follows Commands and Answers Questions 100% of the time;able to follow multistep instructions   Personal Safety and Judgment intact   Pain Assessment   Patient Currently in Pain Yes, see Vital Sign  "flowsheet  (4/10 shoulders, thighs and \"a little bit in the back\")   Integumentary/Edema   Integumentary/Edema Comments Appears to have some mild edema B UEs   Posture    Posture Forward head position;Protracted shoulders  (tendency for L lean sitting)   Range of Motion (ROM)   ROM Comment AROM limited due to decreased strength   Strength   Strength Comments  Profound UE weakness noted in bed with pt demonstrating difficulty lifting R UE to scratch nose and only able to  lift L UE slightly off surface of bed. Not able to perform SLR. Knee extension B 4/5 tested sitting EOB   Bed Mobility   Bed Mobility Comments Rolled L with use of rail and ModA, sidelying to sit with ModA x 2   Transfer Skills   Transfer Comments Sit>stand with MinAx2 and FWW   Gait   Gait Comments not able to ambulate due to extreme fatigue   Balance   Balance Comments Impaired sitting balance with tendency for L lean, needing ModA initially to maintain upright, improved to SBA with feet on ground. Fair standing balance with FWW   Sensory Examination   Sensory Perception Comments denies numbness/tingling, intact to light touch   Modality Interventions   Planned Modality Interventions Cryotherapy   General Therapy Interventions   Planned Therapy Interventions balance training;bed mobility training;gait training;neuromuscular re-education;ROM;strengthening;stretching;transfer training;home program guidelines;progressive activity/exercise   Clinical Impression   Criteria for Skilled Therapeutic Intervention yes, treatment indicated   PT Diagnosis difficulty ambulating   Influenced by the following impairments profound weakness UE>LE, impaired balance, decreased activity tolerance   Functional limitations due to impairments difficulty with bed mobilty, transfers, ambulation and stairs   Clinical Presentation Evolving/Changing  (increasing weakness, febrile overnight, severe fatigue)   Clinical Presentation Rationale clinical judgment   Clinical " "Decision Making (Complexity) Moderate complexity  (severe fatigue, increasing weakness this date per chart)   Therapy Frequency Daily   Predicted Duration of Therapy Intervention (days/wks) 1 week   Anticipated Equipment Needs at Discharge   (none if TCU; may need FWW if home)   Anticipated Discharge Disposition Transitional Care Facility   Risk & Benefits of therapy have been explained Yes   Patient, Family & other staff in agreement with plan of care Yes   Clinical Impression Comments Patient profoundly weak and well below baseline mobility. Patient will benefit from continued skilled therapy to progress strength, safety and independence with mobility prior to returning home.    [facility] AM-PAC TM \"6 Clicks\"   2016, Trustees of [facility], under license to [employer].  All rights reserved.   6 Clicks Short Forms Basic Mobility Inpatient Short Form   [facility]-PAC  \"6 Clicks\" V.2 Basic Mobility Inpatient Short Form   1. Turning from your back to your side while in a flat bed without using bedrails? 2 - A Lot   2. Moving from lying on your back to sitting on the side of a flat bed without using bedrails? 2 - A Lot   3. Moving to and from a bed to a chair (including a wheelchair)? 2 - A Lot   4. Standing up from a chair using your arms (e.g., wheelchair, or bedside chair)? 2 - A Lot   5. To walk in hospital room? 2 - A Lot   6. Climbing 3-5 steps with a railing? 1 - Total   Basic Mobility Raw Score (Score out of 24.Lower scores equate to lower levels of function) 11   Total Evaluation Time   Total Evaluation Time (Minutes) 8     "

## 2019-09-06 NOTE — PROVIDER NOTIFICATION
Paged hospitalist  POSITIVE for STAPHYLOCOCCUS AUREUS and NEGATIVE for the mecA gene. Also Lactic redraw 3.1. Please advise

## 2019-09-06 NOTE — PROGRESS NOTES
Cross coverage  I was notified by nurses that patient is requiring more of assistance now and has worsening generalized weakness  They have requested for me to evaluate the patient.  Seen and examined.  Awake, alert oriented to time place and person  He has generalized weakness, no focal weakness no slurring of speech.  Currently having fever spikes  Presented with dehydration, minimal oral intake, feeling sick and with consideration of underlying viral infection versus tickborne illness  Work-up underway.  Not on antibiotics for now.    Patient has numerous reasons for generalized weakness with numerous electrolyte derangements, dehydration, rhabdomyolysis and suspected sepsis    Addendum: 4:10 AM    I was notified by nursing staff regarding positive blood cultures with GPC drawn earlier during admission  Patient presented with signs and symptoms of infectious process  ID service formal evaluation.    Start antibiotics ceftriaxone and vancomycin

## 2019-09-06 NOTE — PLAN OF CARE
A&Ox4, VSS: febrile. Triggered lactic 3.0, NC bolus given. Increased generalized weakness noted when tried to stand and use the urinal at bedside. Pain managed with PRN Tylenol. Blood culture result showed Gram positive Cocci in clusters, Abx Rocephin and Vanco given. Voiding small amounts per urinal. Mg replaced, recheck is 2.5. Nursing continue to monitor.

## 2019-09-06 NOTE — PROVIDER NOTIFICATION
Paged hospitalist   Increased weakness, leaning more to the left side. Report I got was Ax1, now pt. not  able to sit on the bedside nor stand to use urinal with Ax3.  Can you please come and assess.

## 2019-09-06 NOTE — CONSULTS
Consult Date:  09/06/2019      LOCATION:  Room 625, [facility]      REQUESTING PHYSICIAN:  Vicente Longo.      IMPRESSION:   1.  A 69-year-old male with acute fever, found to have high-grade Staph aureus bacteremia, unclear primary site.  Multiple possible secondary sites.   2.  Pacemaker in place, no signs of infection.   3.  Prior mitral valve repair.   4.  Acute bilateral shoulder pain on exam, not clear infection, but abnormal exam.   5.  Acute back pain, rule out diskitis.   6.  Acute mild renal insufficiency with sepsis.      RECOMMENDATIONS:   1.  Simplify to Ancef, pansensitive Staph aureus in culture.   2.  Serial blood cultures until clear.  Obviously, the key will be clearing of blood given the pacemaker and valve.   3.  Likely should get a transesophageal echocardiogram.  Regardless could start with a transthoracic echocardiogram and wait through the weekend to see how rapidly the blood clears.  Certainly if blood not clearing, will need a RADHA.   4.  Will need extended IV antibiotics but, of course, no long line until blood documented cleared.   5.  Follow shoulder and back pain.  The shoulder is, of course, particularly notable as it may require intervention.  It is infected.  Low threshold to have Orthopedics evaluate if not improving.      HISTORY OF PRESENT ILLNESS:  This 69-year-old male is seen in consultation due to Staph aureus bacteremia.  The patient has generally not had major infection problems historically and no recent suggestion of that.  He became acutely ill on Monday with dramatic shaking, rigorous type chills, fatigue, malaise, sleeping all day on Tuesday and Wednesday and further chills.  He started noticing some shoulder discomfort, some slight low back discomfort and presented with mild confusion and fever.  At presentation cultures were obtained and rapidly they are positive for Staph aureus.  He has had a slight skin area on his right leg, but is not particularly  evident now.  He has the ongoing shoulder discomfort and back discomfort.  Has a pacemaker and mitral valve repair from .      PAST MEDICAL HISTORY:  Mitral valve and pacemaker.  No major infection problems historically.      ALLERGIES:  NO ANTIMICROBIAL ALLERGIES.      MEDICATIONS:  As listed.      SOCIAL AND FAMILY HISTORY:  Unremarkable.  No family history of note.  No recent travel or exposures.  No else he has been around has been ill and no known resistant pathogens.      REVIEW OF SYSTEMS:  Considerable right more than left shoulder discomfort.  This is not an old problem, acutely occurring in the context of the Staph aureus in the blood.  No significant other focal pain site, although some slight lower back pain, somewhat better today.      PHYSICAL EXAMINATION:   GENERAL:  The patient appears his stated age, looks mildly ill.  Earlier febrile to 101, tachycardic in the 90s.   HEENT:  No thrush or intraoral lesions.  Pupils reactive.   NECK:  Supple and nontender, no meningismus, lymphadenopathy or thyromegaly.   HEART:  Mildly tachycardic, slight murmur.  Pacemaker site unremarkable.   LUNGS:  Clear bilaterally.   ABDOMEN:  Slightly distended, slightly tender.   EXTREMITIES:  No obvious lower extremity abnormalities.  No embolic lesions.  Both shoulders hard to move above head level.  Mild tenderness, but no major fluid present that I can tell.      LABORATORY DATA:  Blood cultures rapidly positive for sensitive Staph aureus.      Thank you very much for consultation.  I will follow the patient with you.         JOSSE PEREZ MD             D: 2019   T: 2019   MT: ALEXIS      Name:     STACY MELENDEZ   MRN:      [id number]        Account:       [id number]   :      1950           Consult Date:  2019      Document: [id number]       cc: Cristi Cherry MD

## 2019-09-06 NOTE — PHARMACY-ADMISSION MEDICATION HISTORY
Admission medication history interview status for this patient is complete. See [facility] admission navigator for allergy information, prior to admission medications and immunization status.     Medication history interview source(s):Patient and Family  Medication history resources (including written lists, pill bottles, clinic record):care everywhere  Primary pharmacy:[facility]    Changes made to PTA medication list:  Added: Eliquis, Benicar hydrochlorothiazide, Tylenol  Deleted: ASA, losartan, oxycodone  Changed: none    Actions taken by pharmacist (provider contacted, etc):None     Additional medication history information:None    Medication reconciliation/reorder completed by provider prior to medication history? No    Do you take OTC medications (eg tylenol, ibuprofen, fish oil, eye/ear drops, etc)? yes(Y/N)    For patients on insulin therapy: NO (Y/N)        Prior to Admission medications    Medication Sig Last Dose Taking? Auth Provider   acetaminophen (TYLENOL) 500 MG tablet Take 1,000 mg by mouth every 6 hours as needed for mild pain 9/5/2019 at Unknown time Yes Unknown, Entered By History   apixaban ANTICOAGULANT (ELIQUIS) 5 MG tablet Take 5 mg by mouth 2 times daily 9/5/2019 at am Yes Unknown, Entered By History   olmesartan-hydrochlorothiazide (BENICAR) 20-12.5 MG tablet Take 1 tablet by mouth daily 9/5/2019 at am Yes Unknown, Entered By History

## 2019-09-06 NOTE — PLAN OF CARE
Discharge Planner PT   Patient plan for discharge: not stated  Current status: PT orders received, eval completed, treatment initiated. Pt is 69 y.o. M initially presented to ED 9/4 for evaluation of fever, fatigue, diffuse myalgias, and poor oral intake- was evaluated and sent home but had continued symptoms and returned to ED 9/5. Family noted insect bite on pt's lower leg. Pt admitted 9/5 for febrile illness with worsening labs and dehydration, now noted to have sepsis with bacteremia.   Pt lives with spouse and daughter in house with 4 steps to enter, R rail, all needs met main level. Bathroom set-up includes tub shower with bench available, standard toilet. Pt previously ind with all mobility. Does have FWW and SEC at home, though FWW was spouse's from previous surgery and may be too short for pt.    Currently, pt received supine in bed, agreeable to PT. Noted to have NC on, though O2 turned down to 0. SpO2 94, HR upper 80s/low 90s at rest. Profound UE weakness noted in bed with pt demonstrating difficulty lifting R UE to scratch nose and only able to  lift L UE slightly off surface of bed. Not able to perform SLR. Rolled L with use of rail and ModA, sidelying to sit with ModA x 2. Needs ModA x2 to scoot forward at EOB. Pt demos tendency for L lean sitting at EOB, needing ModA initially to maintain balance, improving to SBA when feet on floor with improved MCKENZIE. Sit>stand with FWW and MinAx2. Able to march in place and take a few steps to turn and sit at bedside chair with MinAx2. Pt with difficulty keeping L UE on FWW, needing assist to manage walker. Stand>sit at chair with Miryam x2. Able to scoot back in chair with SBA. Pt sitting up in recliner, with LEs elevated/back reclined, family in room upon departure of PT. RN updated.     Barriers to return to prior living situation: currently Ax2 for mobility, profound weakness, medical status  Recommendations for discharge: TCU  Rationale for recommendations:  Patient profoundly weak and well below baseline mobility. Patient will benefit from continued skilled therapy to progress strength, safety and independence with mobility prior to returning home.        Entered by: Jaqueline Hwang 09/06/2019 2:56 PM

## 2019-09-06 NOTE — PROVIDER NOTIFICATION
Paged hospitalist  Lab called, Lactic 3.0 VSS in EHR. Pt. has  nausea otherwise asymptomatic sleeping in bed. NS/KCL20 100/hr. Please advise.   [id number]

## 2019-09-06 NOTE — PHARMACY-VANCOMYCIN DOSING SERVICE
Pharmacy Vancomycin Initial Note  Date of Service 2019  Patient's  1950  69 year old, male    Indication: Bacteremia,sepsis    Current estimated CrCl = Estimated Creatinine Clearance: 68.1 mL/min (based on SCr of 1.18 mg/dL).    Creatinine for last 3 days  2019:  5:01 PM Creatinine 1.18 mg/dL    Recent Vancomycin Level(s) for last 3 days  No results found for requested labs within last 72 hours.      Vancomycin IV Administrations (past 72 hours)      No vancomycin orders with administrations in past 72 hours.                Nephrotoxins and other renal medications (From now, onward)    Start     Dose/Rate Route Frequency Ordered Stop    19 0430  vancomycin (VANCOCIN) 1,750 mg in sodium chloride 0.9 % 500 mL intermittent infusion      1,750 mg  over 2 Hours Intravenous EVERY 12 HOURS 19 0421            Contrast Orders - past 72 hours (72h ago, onward)    None                Plan:  1.  Start vancomycin  1750 mg IV q12h.   2.  Goal Trough Level: 15-20 mg/L   3.  Pharmacy will check trough levels as appropriate in 1-3 Days.    4. Serum creatinine levels will be ordered daily for the first week of therapy and at least twice weekly for subsequent weeks.    5. Bostwick method utilized to dose vancomycin therapy: Method 1    Jo Alvarado [facility]

## 2019-09-06 NOTE — PROGRESS NOTES
[facility]  Hospitalist Progress Note  Patient Name: Eh Srivastava    MRN: [id number]  Provider: Vicente Longo MD  09/06/19    Initial presenting complaint/issue to hospital (Diagnosis): febrile illness         Assessment and Plan:      Eh Srivastava is a 69-year-old male with history of hypertension, hernia, heart murmur, atrial fibrillation, pacemaker placement, hernia surgery, tonsillectomy, mitral valve repair, and mandible surgery.  He presented to the emergency department on 9/5/19 for evaluation of fever, fatigue, diffuse myalgias, and poor oral intake.  These symptoms had been progressive over the previous 2 days.  He had been seen in the [facility]rgency Room one day before and evaluation showed largely unremarkable labs including basic metabolic panel, CBC (platelets were low at 85), and urinalysis.  He discharged home from there [facility] Room.  He continued to feel poorly so came to the Emergency Department the next day(9/5/19).  Work-up in the ED showed stable vital signs.  Exam suggested some mild confusion or slowness to answer questions but nonfocal neuro exam.  His family had noted what looked like a possible insect bite on his left medial lower leg. No tick was noted. Laboratory evaluation showed worsening thrombocytopenia with platelet count of 65, hyponatremia with sodium of 127, hypokalemia with potassium 3.1, rhabdomyolysis with CK of 1106, with an elevated C-reactive protein of 173.  Creatinine was also higher than the day before at 1.18.  Infectious disease was consulted in the ED by phone and recommended checking a parasite smear for ehrlichiosis and babesiosis as well as a lyme screen. Eh was admitted to the hospital with febrile illness for which he is failing outpatient management with worsening labs and dehydration. Empiric antibiotics were not started. After admission, 2 of 2 blood cultures grew what ultimately appeared to be MSSA. Rocephin and Vancomycin were started  "overnight. ID was consulted and saw Eh the following day. Rocephin was changed to Ancef.      Problem list:     1.  Sepsis with MSSA bacteremia. Source is unclear. Endocarditis needs to be considered. With back pain, discitis also needs to be considered. ID consult appreciated. Daily blood cultures ordered. TTE is pending (RADHA was unable to be done this afternoon). Continue Ancef and Vancomycin. If back pain persists he may need CT to evaluate for discitis (no MRI with pacemaker).    2.  Modest lactic acidosis, likely due to sepsis. Repeat lactic acid level.      3.  Dehydration with rising creatinine.  Improving with IVF hydration.      4.  Hyponatremia.  Improving with NS hydration.      5.  Hypokalemia.  Replaced per protocol.     6.  Mild rhabdomyolysis.  Improving with IV fluid. AM CK.     7.  Acute kidney injury, mild.  This is likely due to dehydration and less likely rhabdomyolysis.  Monitor renal function with IV fluid hydration.     8.  Thrombocytopenia, worsening. No signs of bleeding. Suspect related to sepsis. Peripheral suggested the same (reactive) Continue to monitor. Continue to avoid anticoagulants for DVT prophylaxis. Continue to hold PTA Elliquis for now.      9. Infectious and metabolic encephalopathy. Seems better today.      Full code  Mechanical DVT prophylaxis; Hold PTA Elliquis  Disposition: continue inpatient care. Likely here for several more days.         Interval History:      No new problems. Myalgias and low back pain are a bit better.                   Physical Exam:      Last Vital Signs:  /66 (BP Location: Right arm)   Pulse 112   Temp 95.2  F (35.1  C) (Oral)   Resp 22   Ht 1.778 m (5' 10\")   Wt 88.5 kg (195 lb)   SpO2 95%   BMI 27.98 kg/m      Intake/Output Summary (Last 24 hours) at 9/6/2019 1448  Last data filed at 9/6/2019 1227  Gross per 24 hour   Intake 2668 ml   Output 750 ml   Net 1918 ml     GENERAL:  A little uncomfortable. Cooperative.  PSYCH: pleasant, " oriented, No acute distress.  EYES: PERRLA, Normal conjunctiva.  HEART:  Regular rate and rhythm. No JVD. Pulses normal. No edema.  LUNGS:  Clear to auscultation, normal Respiratory effort.  ABDOMEN:  Soft, no hepatosplenomegaly, normal bowel sounds.  EXTREMETIES: No clubbing, cyanosis or ischemia  SKIN:  Dry to touch, No rash.  BACK: Low back pain over lumbar spine           Medications:      All current medications were reviewed.         Data:      All new lab and imaging data was reviewed.   Labs:  Recent Labs   Lab 09/05/19  1710 09/05/19  1702   CULT Cultured on the 1st day of incubation:  Gram positive cocci in clusters  *  Critical Value/Significant Value, preliminary result only, called to and read back by  Flori Oliveira RN 9/6/19 @ 0437 TF   Cultured on the 1st day of incubation:  Staphylococcus aureus  Susceptibility testing in progress  *  Critical Value/Significant Value, preliminary result only, called to and read back by  Nikkie Alaniz RN 9/6/19 @ 0403 TF    (Note)  POSITIVE for STAPHYLOCOCCUS AUREUS and NEGATIVE for the mecA gene  (not MRSA) by [employer] multiplex nucleic acid test. The mecA gene was  not detected. Final identification and antimicrobial susceptibility  testing will be verified by standard methods.    Critical Value/Significant Value called to and read back by Markus Stephen RN at 0702 on 09.06.19 by     Specimen tested with Verigene multiplex, gram-positive blood culture  nucleic acid test for the following targets: Staph aureus, Staph  epidermidis, Staph lugdunensis, other Staph species, Enterococcus  faecalis, Enterococcus faecium, Streptococcus species, S. agalactiae,  S. anginosus grp., S. pneumoniae, S. pyogenes, Listeria sp., mecA  (methicillin resistance) and Siomara/B (vancomycin resistance).              Lab Results   Component Value Date     09/06/2019     09/05/2019     10/20/2010    Lab Results   Component Value Date    CHLORIDE 95 09/06/2019     CHLORIDE 91 09/05/2019    CHLORIDE 101 10/20/2010    Lab Results   Component Value Date    BUN 26 09/06/2019    BUN 25 09/05/2019    BUN 15 10/20/2010      Lab Results   Component Value Date    POTASSIUM 3.6 09/06/2019    POTASSIUM 3.4 09/06/2019    POTASSIUM 3.1 09/05/2019    Lab Results   Component Value Date    CO2 27 09/06/2019    CO2 28 09/05/2019    CO2 31 10/20/2010    Lab Results   Component Value Date    CR 1.17 09/06/2019    CR 1.18 09/05/2019    CR 1.01 06/23/2014        Recent Labs   Lab 09/06/19  0632 09/05/19  1701   WBC 4.6 7.9   HGB 12.4* 12.2*   HCT 35.5* 35.7*   MCV 90 92   PLT 50* 65*

## 2019-09-06 NOTE — PROVIDER NOTIFICATION
Paged hospitalist  [facility] called with positive lab result  from Gram positive Cocci in clusters. Collected 9/5, left arm.

## 2019-09-06 NOTE — PROGRESS NOTES
[facility]    Sepsis Evaluation Progress Note    Date of Service: 09/06/2019    I was called to see Eh Srivastava due to abnormal vital signs triggering the Sepsis SIRS screening alert. He is known to have an infection.     Physical Exam    Vital Signs:  Temp: 101.1  F (38.4  C) Temp src: Oral BP: 105/61 Pulse: 84   Resp: 26 SpO2: 96 % O2 Device: None (Room air)      Lab:  Lactate for Sepsis Protocol   Date Value Ref Range Status   09/06/2019 3.0 (H) 0.7 - 2.0 mmol/L Final     Comment:     Significant value called to and read back by  RICARDO LIRIANO ([id number]) ON 09.06.19 AT 0044 BY KV         The patient is at baseline mental status.  I was informed by nursing service that patient is sleeping, no ongoing symptoms and earlier has some nausea        Assessment and Plan    The SIRS and exam findings are likely due to   sepsis.     ID: The patient is currently on the following antibiotics:  Anti-infectives (From now, onward)    None        Current antibiotic coverage Suspected viral infection, with work-up regarding tickborne illness.  Deferring antibiotics coverage as per H&P earlier..  Lactic acidosis also likely from dehydration    Fluid: Fluid bolus ordered.    Lab: Repeat lactic acid ordered    Disposition: The patient will remain on the current unit. We will continue to monitor this patient closely.    Wayne Cooper MD, MD

## 2019-09-07 ENCOUNTER — APPOINTMENT (OUTPATIENT)
Dept: CT IMAGING | Facility: CLINIC | Age: 69
DRG: 871 | End: 2019-09-07
Attending: INTERNAL MEDICINE
Payer: MEDICARE

## 2019-09-07 ENCOUNTER — APPOINTMENT (OUTPATIENT)
Dept: PHYSICAL THERAPY | Facility: CLINIC | Age: 69
DRG: 871 | End: 2019-09-07
Payer: MEDICARE

## 2019-09-07 ENCOUNTER — APPOINTMENT (OUTPATIENT)
Dept: GENERAL RADIOLOGY | Facility: CLINIC | Age: 69
DRG: 871 | End: 2019-09-07
Attending: ORTHOPAEDIC SURGERY
Payer: MEDICARE

## 2019-09-07 PROBLEM — A41.01 MSSA (METHICILLIN SUSCEPTIBLE STAPHYLOCOCCUS AUREUS) SEPTICEMIA (H): Status: ACTIVE | Noted: 2019-09-07

## 2019-09-07 PROBLEM — Z95.0 CARDIAC PACEMAKER IN SITU: Status: ACTIVE | Noted: 2019-09-07

## 2019-09-07 PROBLEM — R78.81 GRAM-NEGATIVE BACTEREMIA: Status: ACTIVE | Noted: 2019-09-07

## 2019-09-07 PROBLEM — Z91.89 AT HIGH RISK FOR INFECTION: Status: ACTIVE | Noted: 2019-09-07

## 2019-09-07 LAB
ALBUMIN UR-MCNC: 300 MG/DL
ANION GAP SERPL CALCULATED.3IONS-SCNC: 8 MMOL/L (ref 3–14)
APPEARANCE UR: CLEAR
BILIRUB UR QL STRIP: NEGATIVE
BUN SERPL-MCNC: 27 MG/DL (ref 7–30)
CALCIUM SERPL-MCNC: 7.7 MG/DL (ref 8.5–10.1)
CHLORIDE SERPL-SCNC: 97 MMOL/L (ref 94–109)
CK SERPL-CCNC: 456 U/L (ref 30–300)
CO2 SERPL-SCNC: 22 MMOL/L (ref 20–32)
COLOR UR AUTO: YELLOW
CREAT SERPL-MCNC: 1.06 MG/DL (ref 0.66–1.25)
ERYTHROCYTE [DISTWIDTH] IN BLOOD BY AUTOMATED COUNT: 13 % (ref 10–15)
GFR SERPL CREATININE-BSD FRML MDRD: 72 ML/MIN/{1.73_M2}
GLUCOSE BLDC GLUCOMTR-MCNC: 103 MG/DL (ref 70–99)
GLUCOSE SERPL-MCNC: 110 MG/DL (ref 70–99)
GLUCOSE UR STRIP-MCNC: NEGATIVE MG/DL
HCT VFR BLD AUTO: 33.8 % (ref 40–53)
HGB BLD-MCNC: 11.6 G/DL (ref 13.3–17.7)
HGB UR QL STRIP: ABNORMAL
KETONES UR STRIP-MCNC: ABNORMAL MG/DL
LACTATE BLD-SCNC: 2.5 MMOL/L (ref 0.7–2)
LEUKOCYTE ESTERASE UR QL STRIP: NEGATIVE
MCH RBC QN AUTO: 31.1 PG (ref 26.5–33)
MCHC RBC AUTO-ENTMCNC: 34.3 G/DL (ref 31.5–36.5)
MCV RBC AUTO: 91 FL (ref 78–100)
NITRATE UR QL: NEGATIVE
PH UR STRIP: 6.5 PH (ref 5–7)
PLATELET # BLD AUTO: 39 10E9/L (ref 150–450)
POTASSIUM SERPL-SCNC: 3.5 MMOL/L (ref 3.4–5.3)
RBC # BLD AUTO: 3.73 10E12/L (ref 4.4–5.9)
RBC #/AREA URNS AUTO: 3 /HPF (ref 0–2)
SODIUM SERPL-SCNC: 127 MMOL/L (ref 133–144)
SOURCE: ABNORMAL
SP GR UR STRIP: 1.01 (ref 1–1.03)
SQUAMOUS #/AREA URNS AUTO: <1 /HPF (ref 0–1)
UROBILINOGEN UR STRIP-MCNC: NORMAL MG/DL (ref 0–2)
WBC # BLD AUTO: 6.1 10E9/L (ref 4–11)
WBC #/AREA URNS AUTO: 5 /HPF (ref 0–5)

## 2019-09-07 PROCEDURE — 70470 CT HEAD/BRAIN W/O & W/DYE: CPT

## 2019-09-07 PROCEDURE — 36415 COLL VENOUS BLD VENIPUNCTURE: CPT | Performed by: INTERNAL MEDICINE

## 2019-09-07 PROCEDURE — 73100 X-RAY EXAM OF WRIST: CPT | Mod: LT,52

## 2019-09-07 PROCEDURE — 87040 BLOOD CULTURE FOR BACTERIA: CPT | Performed by: INTERNAL MEDICINE

## 2019-09-07 PROCEDURE — 70450 CT HEAD/BRAIN W/O DYE: CPT

## 2019-09-07 PROCEDURE — 00000146 ZZHCL STATISTIC GLUCOSE BY METER IP

## 2019-09-07 PROCEDURE — 25000128 H RX IP 250 OP 636: Performed by: INTERNAL MEDICINE

## 2019-09-07 PROCEDURE — 72128 CT CHEST SPINE W/O DYE: CPT

## 2019-09-07 PROCEDURE — 72125 CT NECK SPINE W/O DYE: CPT

## 2019-09-07 PROCEDURE — 82550 ASSAY OF CK (CPK): CPT | Performed by: INTERNAL MEDICINE

## 2019-09-07 PROCEDURE — 25000125 ZZHC RX 250: Performed by: INTERNAL MEDICINE

## 2019-09-07 PROCEDURE — 81001 URINALYSIS AUTO W/SCOPE: CPT | Performed by: INTERNAL MEDICINE

## 2019-09-07 PROCEDURE — 83605 ASSAY OF LACTIC ACID: CPT | Performed by: INTERNAL MEDICINE

## 2019-09-07 PROCEDURE — 25000132 ZZH RX MED GY IP 250 OP 250 PS 637: Mod: GY | Performed by: INTERNAL MEDICINE

## 2019-09-07 PROCEDURE — 12000000 ZZH R&B MED SURG/OB

## 2019-09-07 PROCEDURE — 80048 BASIC METABOLIC PNL TOTAL CA: CPT | Performed by: INTERNAL MEDICINE

## 2019-09-07 PROCEDURE — 97116 GAIT TRAINING THERAPY: CPT | Mod: GP

## 2019-09-07 PROCEDURE — 97530 THERAPEUTIC ACTIVITIES: CPT | Mod: GP

## 2019-09-07 PROCEDURE — 99233 SBSQ HOSP IP/OBS HIGH 50: CPT | Performed by: INTERNAL MEDICINE

## 2019-09-07 PROCEDURE — 85027 COMPLETE CBC AUTOMATED: CPT | Performed by: INTERNAL MEDICINE

## 2019-09-07 PROCEDURE — 73020 X-RAY EXAM OF SHOULDER: CPT | Mod: 50

## 2019-09-07 PROCEDURE — 72132 CT LUMBAR SPINE W/DYE: CPT

## 2019-09-07 RX ORDER — LEVOFLOXACIN 5 MG/ML
750 INJECTION, SOLUTION INTRAVENOUS EVERY 24 HOURS
Status: DISCONTINUED | OUTPATIENT
Start: 2019-09-07 | End: 2019-09-08 | Stop reason: HOSPADM

## 2019-09-07 RX ORDER — IOPAMIDOL 755 MG/ML
500 INJECTION, SOLUTION INTRAVASCULAR ONCE
Status: COMPLETED | OUTPATIENT
Start: 2019-09-07 | End: 2019-09-07

## 2019-09-07 RX ADMIN — CEFAZOLIN SODIUM 2 G: 2 INJECTION, SOLUTION INTRAVENOUS at 10:59

## 2019-09-07 RX ADMIN — ACETAMINOPHEN 650 MG: 325 TABLET, FILM COATED ORAL at 23:09

## 2019-09-07 RX ADMIN — ACETAMINOPHEN 650 MG: 325 TABLET, FILM COATED ORAL at 18:33

## 2019-09-07 RX ADMIN — ACETAMINOPHEN 650 MG: 325 TABLET, FILM COATED ORAL at 08:38

## 2019-09-07 RX ADMIN — SODIUM CHLORIDE 30 ML: 9 INJECTION, SOLUTION INTRAVENOUS at 22:39

## 2019-09-07 RX ADMIN — SODIUM CHLORIDE 62 ML: 9 INJECTION, SOLUTION INTRAVENOUS at 16:21

## 2019-09-07 RX ADMIN — IOPAMIDOL 99 ML: 755 INJECTION, SOLUTION INTRAVENOUS at 16:21

## 2019-09-07 RX ADMIN — POTASSIUM CHLORIDE AND SODIUM CHLORIDE: 900; 150 INJECTION, SOLUTION INTRAVENOUS at 10:10

## 2019-09-07 RX ADMIN — CEFAZOLIN SODIUM 2 G: 2 INJECTION, SOLUTION INTRAVENOUS at 18:33

## 2019-09-07 RX ADMIN — LEVOFLOXACIN 750 MG: 5 INJECTION, SOLUTION INTRAVENOUS at 11:50

## 2019-09-07 RX ADMIN — CEFAZOLIN SODIUM 2 G: 2 INJECTION, SOLUTION INTRAVENOUS at 03:04

## 2019-09-07 RX ADMIN — IOPAMIDOL 50 ML: 755 INJECTION, SOLUTION INTRAVENOUS at 22:39

## 2019-09-07 ASSESSMENT — ACTIVITIES OF DAILY LIVING (ADL)
ADLS_ACUITY_SCORE: 17

## 2019-09-07 NOTE — PROVIDER NOTIFICATION
Provider paged at 1339: FYI: [facility] called. The machine used to detect the type of Gram - Rods, it resulted back not detected, this machine does not cover every organism out there. They will have to do a manual culture. So no result as of yet.

## 2019-09-07 NOTE — PROGRESS NOTES
[facility] infectious disease lab called. Blood culture from R arm drawn on 9/6/19 at 1144 growing Gram + Cocci in clusters as well as Gram - rods. MD at bedside and informed.   Paged infectious disease [phone]

## 2019-09-07 NOTE — PLAN OF CARE
A&Ox4. Febrile at beginning of shift, 101.9, PRN tylenol given, re-check 97.4. Tachycardic. Diaphoretic. LS clear, on 1.5 LPM. +BS/gas, denies N/V/abd pain, tolerating diet. Voiding in adequate amounts. BC from R arm drawn on 9/6/19 at 1144 growing Gram + Cocci in clusters as well as Gram - Rods, Levaquin added. Left arm more swollen than right, and is much weaker than the right. CT, UA, Daily BC, and repeat lactic ordered. Lactic results back, down to 2.5. PT recommending TCU on discharge as patient is below baseline level of functioning. Will continue to monitor.

## 2019-09-07 NOTE — PROGRESS NOTES
"CLINICAL NUTRITION SERVICES  -  ASSESSMENT NOTE      Future/Additional Recommendations:  Can add oral nutrition supplements prn if any decline in intake/appetite   Malnutrition:   Deferred  - Unable to determine due to incomplete nutrition focused physical assessment, diet and weight history     REASON FOR ASSESSMENT  Eh Srivastava is a 69 year old male seen by the dietitian for positive nutrition risk screen - Reduced oral intake over the last month    History of hypertension, hernia, heart murmur, atrial fibrillation, pacemaker placement, hernia surgery, tonsillectomy, mitral valve repair, and mandible surgery.      NUTRITION HISTORY    Information obtained from chart review (with therapy, provider during attempted visits)    Food allergies/intolerances: NKFA     CURRENT NUTRITION ORDERS    Diet: Regular    Supplement:   Current Intake/Tolerance:    Per flow sheet review, 100% intake for 1 documented meal.     Factors affecting nutrition intake include: decreased appetite    NUTRITION FOCUSED PHYSICAL ASSESSMENT (NFPA) + PHYSICAL FINDINGS  Completed:  Deferred due to patient availability   Obtained from Chart/Interdisciplinary Team     Generalized weakness    Lethargy    ANTHROPOMETRICS  Height: 5' 10\"  Weight: 88 kg   Body mass index is 27.98 kg/m .  Weight Status:  Overweight BMI 25-29.9  Ideal body weight: 75.5 kg +/- 10%, 117% of IBW   Weight History:  Unable to evaluate with no recent weight documentation in EMR  Wt Readings from Last 10 Encounters:   09/05/19 88.5 kg (195 lb)   06/23/14 85.7 kg (189 lb)   03/24/14 87.1 kg (192 lb)       ASSESSED NUTRITION NEEDS (PER APPROVED PRACTICE GUIDELINES, Dosing weight: 88.5 kg):  Estimated Energy Needs: 9321-7221 kcals (25-30 Kcal/Kg)  Justification: maintenance   Estimated Protein Needs: + grams protein (1-1.2+ g pro/Kg)  Justification: preservation of lean body mass  Estimated Fluid Needs: >1 mL/Kcal  Justification: maintenance    LABS  Labs reviewed  No " results found for: URINEKETONE  Recent Labs   Lab Test 09/07/19  0654 09/06/19  0632 09/06/19  0307 09/05/19  1701 06/23/14  0719   POTASSIUM 3.5 3.6 3.4 3.1* 3.9     No results for input(s): PHOS in the last 17229 hours.  Recent Labs   Lab Test 09/06/19  0307 09/05/19  1701   MAG 2.5* 1.5*     Recent Labs   Lab Test 09/07/19  0654 09/06/19  0632 09/05/19  1701   * 128* 127*     Recent Labs   Lab Test 09/07/19  0654 09/06/19  0632 09/05/19  1701 06/23/14  0719   CR 1.06 1.17 1.18 1.01     Recent Labs   Lab 09/07/19  0654 09/06/19  0632 09/05/19  1701   * 127* 123*     No results found for: A1C  No results found for: TRIG     MEDICATIONS    Medications reviewed    IVF at 100 mL/hr    NUTRITION DIAGNOSIS:  Predicted inadequate nutrient intake (protein energy) related to generalized weakness with unknown etiology, hospitalization    INTERVENTIONS  Recommendations / Nutrition Prescription  Continue regular diet as ordered + can add oral nutrition supplements prn if any decline in intake/appetite    Implementation  Nutrition education: Per Provider order if indicated   Medical food supplement: ok to add prn if any decline in intake/appetite    Goals  Patient to consume >/= 75% of meals TID    MONITORING AND EVALUATION:  Progress towards goals will be monitored and evaluated per protocol and Practice Guidelines      Iris Byrd RDN, LD, CNSC  Pager - 3rd floor/ICU: [phone]  Pager - All other floors: [phone]  Pager - Weekend/holiday: [phone]  Office: [phone]

## 2019-09-07 NOTE — PLAN OF CARE
Discharge Planner PT   Patient plan for discharge: did not state this date  Current status: Pt received supine in bed, agreeable to PT. Continues to have substantial UE>LE weakness with difficulty raising UE to nose, lacking  strength L hand. MaxAx2 supine>sit with HOB elevated. Needing ModAx2 to scoot forward at EOB initially, but once closer to EOB able to scoot forward with Edin- SBA. Improved sitting balance today with minimal L lean noted. Sit>stand from bed with FWW and ModA x2. Ambulated 25' in room with FWW and ModA with Edin of 2nd person, needing intermittent assist to keep L hand on walker. Pt needing assist to manage walker- demos difficulty pushing walker forward with L UE , with tendency for walker to veer L, needing assist to correct. Short steps with min clearance. Performed stand<>sit x3 reps with ModAx2 and FWW, needing increased assist on 2nd and 3rd rep. Practiced pushing walker forward with L UE while standing. Pt sitting in recliner upon departure of PT, all needs in reach, RN aware.  Barriers to return to prior living situation: currently Ax2 for mobility, profound weakness, medical status  Recommendations for discharge: TCU  Rationale for recommendations: Patient profoundly weak and well below baseline mobility. Patient will benefit from continued skilled therapy to progress strength, safety and independence with mobility prior to returning home.        Entered by: Jaqueline Hwang 09/07/2019 12:01 PM

## 2019-09-07 NOTE — PLAN OF CARE
Orientation: Alert and oriented x 4  VSS. 95% on 1.5 L NC.   HR 96.   LS: clear and equal, but shallow and fast. RR 34  GI:  Passing gas. No BM. Denies N/V.   : Adequate urine output. Needs help with urinal.  Skin: clean and dry  Activity: 2 assist. Joanne steady. Pt slept comfortably throughout shift.   Pain: 4/10. Scheduled Tylenol.  Plan: Continue with current cares. Hx of pacemaker, Murmur and HTN.On IV ancef. Pts has been afebrile overnight. Will need to be on longtime ABX. Tolerating reg diet. Will continue to monitor.

## 2019-09-07 NOTE — PROGRESS NOTES
[facility]    Sepsis Evaluation Progress Note    Date of Service: 09/06/2019    I was called to see Eh Srivastava due to abnormal vital signs triggering the Sepsis SIRS screening alert. He is known to have an infection.     Physical Exam    Vital Signs:  Temp: 98.4  F (36.9  C) Temp src: Oral BP: 98/54 Pulse: 88   Resp: 26 SpO2: 94 % O2 Device: None (Room air) Oxygen Delivery: 1 LPM    Lab:  Lactic Acid   Date Value Ref Range Status   09/06/2019 4.8 (HH) 0.7 - 2.0 mmol/L Final     Comment:     Critical Value called to and read back by  KRYSTAL PEGUERO ([email or web address]) ON 09.06.2019 AT 1927, SP       Lactate for Sepsis Protocol   Date Value Ref Range Status   09/06/2019 3.0 (H) 0.7 - 2.0 mmol/L Final     Comment:     Significant value called to and read back by  RICARDO LIRIANO ([email or web address]) ON 09.06.19 AT 0044 BY KV         The patient is at baseline mental status.    The rest of their physical exam is significant for patient appears a bit in discomfort.  Lungs otherwise clear.  No lower extremity edema.  Cardiovascular exam normal S1-S2 regular but tachycardic.    Assessment and Plan    The SIRS and exam findings are likely due to   sepsis.     ID: The patient is currently on the following antibiotics:  Anti-infectives (From now, onward)    Start     Dose/Rate Route Frequency Ordered Stop    09/06/19 1115  ceFAZolin (ANCEF) intermittent infusion 2 g in 100 mL dextrose PRE-MIX      2 g  200 mL/hr over 30 Minutes Intravenous EVERY 8 HOURS 09/06/19 1107          Current antibiotic coverage is appropriate for source of infection.    Fluid: Fluid bolus ordered.    Lab: Repeat lactic acid is not indicated.    Disposition: The patient will remain on the current unit. We will continue to monitor this patient closely.    Mandie Robertson MD, MD

## 2019-09-07 NOTE — PLAN OF CARE
Pt A&O, vitals monitored, max temp of 101.2 and on 1.5L O2.  Denies pain, tylenol administered for temp.  RR up to 40 at one point with pt taking shallow breaths.  Up with heavy Ax2, jovanni steady used at end of felisha shift due to pt weakness.  Lactic recheck 4.8, pt received bolus x 2 this shift, now on NS&Kcl at 100/hr.  IV ancef administered per order.  Voiding adequate amounts, needing help to hold urinal.  Tolerating regular diet.  Family present majority of shift.  Will continue to monitor.

## 2019-09-07 NOTE — PROGRESS NOTES
"Note Infectious Disease Consult Service Progress Note  Covering for [employer]   Pt Name Eh Srivastava   Date 09/07/2019   MRN [id number]     Notes / labs / imaging test results and other data were reviewed    CHIEF COMPLAINT: REASON FOR VISIT    Follow-up    HPI    68 yo 9/5 admited with acute fever /  Staph aureus bacteremia    Pacemaker in place, no signs of infection.   Prior mitral valve repair.   Acute bilateral shoulder pain on exam, not clear infection, but abnormal exam.   Acute back pain, rule out diskitis.   Acute mild renal insufficiency with sepsis    9/7 Now 1 blood culture w GNR => levofloxacin added   Can't move  L wrist (not pain, just cant), cant squeeze w L hand  L arm more swollen  R shoulder painful and tender  Per family, some words slurred  Some lower back pain      Remainder of 14-point ROS was negative except as listed above    PFSH:  Personal / Family / Social Histories were reviewed and updated  nothing new  Vital Signs: /63 (BP Location: Right arm)   Pulse 100   Temp 97.5  F (36.4  C) (Oral)   Resp 25   Ht 1.778 m (5' 10\")   Wt 88.5 kg (195 lb)   SpO2 100%   BMI 27.98 kg/m    EXAM    Const    In chair  no acute distress     extrem   L arm more swollen than R  L hand more swollen than R       neuro   Normal conversation  Symmetrical face / midline tongue / can puff cheeks  Can't do TANNA fingers L hand  Can lift both arms up off chair  5/5 ankle dorsi flex bilat     CV   I cannot hear murmur     Chest   Not tender over pacer L chest   lungs   clear     abd   soft   skin   Rash-free   psyche   calm, coherent, cooperative, appropriate          Data  Cultures    9/5 Blood MSSA  9/6 Blood MSSA and now also GNR    LABS  Lab Results   Component Value Date/Time    WBC 6.1 09/07/2019 07:14 AM    WBC 4.6 09/06/2019 06:32 AM    WBC 7.9 09/05/2019 05:01 PM    WBC 6.8 10/20/2010 02:00 PM    AST 79 (H) 09/05/2019 05:01 PM    AST 69 (H) 10/20/2010 02:00 PM    ALT 52 09/05/2019 " 05:01 PM    ALT 60 10/20/2010 02:00 PM    ALKPHOS 53 09/05/2019 05:01 PM    ALKPHOS 125 10/20/2010 02:00 PM           ASSESSMENT & SUGGESTIONS    (R78.81) Gram-negative bacteremia  (primary encounter diagnosis)  (R50.9) Acute febrile illness  (M62.81) Generalized muscle weakness  (A41.01) MSSA (methicillin susceptible Staphylococcus aureus) septicemia (H)  (Z91.89) At high risk for infection  (Z95.0) Cardiac pacemaker in situ    MSSA sepsis /likely endocarditis  ? Septic arthritis R shoulder  ? Why L  Arm weak (? C-spine, ? Intracranial)  Low back pain ? L-spine infection  L arm swelling - ? DVT    Daily blood cutlures  No central iv until blood sterile (ie cultures  Neg > 72 h)  Image spine (if cant do MRI, then CT C/TandL spine)  rec eval R shoulder to see if septic arthritis  rec head CT - per family, pt sometimes slurring speech, trouble moving L arm ? R hemisphere or cerebellar lesion  rec RADHA early next week    Message to Dr Donta Blake MD  Covering for [employer]  Infectious Disease service    CURRENT MED REVIEWD  Current Facility-Administered Medications   Medication     0.9% sodium chloride + KCl 20 mEq/L infusion     acetaminophen (TYLENOL) tablet 650 mg     bisacodyl (DULCOLAX) Suppository 10 mg     ceFAZolin (ANCEF) intermittent infusion 2 g in 100 mL dextrose PRE-MIX     levofloxacin (LEVAQUIN) infusion 750 mg     lidocaine (LMX4) cream     lidocaine 1 % 0.1-1 mL     magnesium hydroxide (MILK OF MAGNESIA) suspension 30 mL     magnesium sulfate 2 g in NS intermittent infusion ([facility] or [facility] C[facility]d)     magnesium sulfate 4 g in 100 mL sterile water (premade)     melatonin tablet 1 mg     naloxone (NARCAN) injection 0.1-0.4 mg     ondansetron (ZOFRAN-ODT) ODT tab 4 mg    Or     ondansetron (ZOFRAN) injection 4 mg     potassium chloride (KLOR-CON) Packet 20-40 mEq     potassium chloride 10 mEq in 100 mL intermittent infusion with 10 mg lidocaine     potassium chloride 10 mEq  in 100 mL sterile water intermittent infusion (premix)     potassium chloride 20 mEq in 50 mL intermittent infusion     potassium chloride ER (K-DUR/KLOR-CON M) CR tablet 20-40 mEq     prochlorperazine (COMPAZINE) injection 5 mg    Or     prochlorperazine (COMPAZINE) tablet 5 mg    Or     prochlorperazine (COMPAZINE) Suppository 12.5 mg     senna-docusate (SENOKOT-S/PERICOLACE) 8.6-50 MG per tablet 1 tablet    Or     senna-docusate (SENOKOT-S/PERICOLACE) 8.6-50 MG per tablet 2 tablet     sodium chloride (PF) 0.9% PF flush 3 mL     sodium chloride (PF) 0.9% PF flush 3 mL

## 2019-09-07 NOTE — PROGRESS NOTES
Informed by lab of Lactic Acid = 4.8. Relayed this information to patient's RN who will notify physician.

## 2019-09-07 NOTE — PROGRESS NOTES
[facility]  Hospitalist Progress Note  Patient Name: Eh Srivastava    MRN: [id number]  Provider: Vicente Longo MD  09/07/19    Initial presenting complaint/issue to hospital (Diagnosis): fever         Assessment and Plan:      Eh Srivastava is a 69-year-old male with history of hypertension, hernia, heart murmur, atrial fibrillation, pacemaker placement, hernia surgery, tonsillectomy, mitral valve repair, and mandible surgery.  He presented to the emergency department on 9/5/19 for evaluation of fever, fatigue, diffuse myalgias, and poor oral intake.  These symptoms had been progressive over the previous 2 days.  He had been seen in the [facility] one day before and evaluation was largely unremarkable- including basic metabolic panel, CBC (platelets were low at 85), urinalysis, and chest Xray.  He discharged home from the [facility].  He continued to feel poorly so came to the Emergency Department the next day (9/5/19).  Work-up in the ED showed stable vital signs.  Exam suggested some mild confusion or slowness to answer questions but nonfocal neuro exam.  His family had noted what looked like a possible insect bite on his left medial lower leg. No tick was noted. Laboratory evaluation showed worsening thrombocytopenia with platelet count of 65, hyponatremia with sodium of 127, hypokalemia with potassium 3.1, rhabdomyolysis with CK of 1106, and an elevated C-reactive protein of 173.  Creatinine was also higher than the day before at 1.18.  Infectious disease was consulted in the ED by phone and recommended checking a parasite smear for ehrlichiosis and babesiosis as well as a lyme screen. Eh was admitted to the hospital with febrile illness for which he was failing outpatient management with worsening labs and dehydration. Empiric antibiotics were not started. After admission, 2 of 2 blood cultures grew what was ultimately identified as MSSA. Rocephin and Vancomycin were started overnight.  ID was consulted and saw Eh the following day, changing Rocephin to Ancef and stopping Vancomycin. Fevers and diffuse myaglias including lower mid back pain persisted. TTE was obtained on 9/6/19 with concerns about endocarditis and was unremarkable.  On 9/7 repeat blood cultures obtained on 9/6/19 grew gram positive cocci and gram negative rods.      Problem list:     1.  Sepsis with MSSA bacteremia and now gram negative prashanth bacteremia. Endocarditis seems most likely. I discussed new gram negative rods with ID and addition of IV Levaquin was recommended (ordered). I will obtain UA with reflexive UC. With persistent back pain, discitis also needs to be considered. I will obtain CT of lumbar spine with IV contrast today (discussed with MR tech and we are unable to do MRI in patients with pacemaker at this facility). Daily blood cultures ordered. Eh will need RADHA on Monday.     2.  Lactic acidosis, due to sepsis. S/p IVF bolus yesterday and last evening. Repeat lactic acid level today.      3.  Dehydration with rising creatinine on admission.  Improving with IVF hydration (creatinine is downward trending).      4.  Hyponatremia.  Monitor with NS hydration.      5.  Hypokalemia.  Replaced per protocol.     6.  Mild rhabdomyolysis.  Resolved with IV fluid.      7.  Acute kidney injury, mild, resolved.  This was likely due to dehydration and less likely rhabdomyolysis.  Monitor renal function with IV fluid hydration.     8.  Thrombocytopenia, worsening. No signs of bleeding. Suspect related to sepsis. Peripheral smear suggested the same (reactive). Continue to monitor. Continue to avoid anticoagulants for DVT prophylaxis. Continue to hold PTA Elliquis for now.      9. Infectious and metabolic encephalopathy. Seems better again today.      Full code  Mechanical DVT prophylaxis; Hold PTA Elliquis  Disposition: continue inpatient care. Likely here for several more days.         Interval History:      Feeling a little  "better- still with diffuse myalgias in shoulders, arms, legs and lower back- but all improved some. No cough, dysuria, diarrhea, or abdominal pain.                   Physical Exam:      Last Vital Signs:  /63 (BP Location: Right arm)   Pulse 100   Temp 97.5  F (36.4  C) (Oral)   Resp 25   Ht 1.778 m (5' 10\")   Wt 88.5 kg (195 lb)   SpO2 100%   BMI 27.98 kg/m      Intake/Output Summary (Last 24 hours) at 9/7/2019 1145  Last data filed at 9/7/2019 1010  Gross per 24 hour   Intake 3232 ml   Output 625 ml   Net 2607 ml       GENERAL:  Slightly uncomfortable. Cooperative.  PSYCH: pleasant, oriented, No acute distress.  EYES: PERRLA, Normal conjunctiva.  HEART:  Regular rate and rhythm. No JVD. Pulses normal. No edema.  LUNGS:  Clear to auscultation, normal Respiratory effort.  ABDOMEN:  Soft, no hepatosplenomegaly, normal bowel sounds.  EXTREMETIES: No clubbing, cyanosis or ischemia. No red or swollen joints. Mild diffuse pain with ROM. Some tenderness with palpation over lumbar spine.   SKIN:  Dry to touch, No rash.           Medications:      All current medications were reviewed.         Data:      All new lab and imaging data was reviewed.   Labs:  Recent Labs   Lab 09/07/19  0713 09/07/19  0653 09/06/19  1402 09/06/19  1154 09/06/19  1144 09/05/19  1710 09/05/19  1702   CULT PENDING PENDING No growth after 10 hours Cultured on the 1st day of incubation:  Gram positive cocci in clusters  *  Critical Value/Significant Value, preliminary result only, called to and read back by  BRENTON VITALE RN 0438 9.7.19 NP   Cultured on the 1st day of incubation:  Gram positive cocci in clusters  *  Cultured on the 1st day of incubation:  Gram negative rods  *  Critical Value/Significant Value, preliminary result only, called to and read back by  LAZ CAMPOVERDE RN 1042 09/07/19 AA   Cultured on the 1st day of incubation:  Staphylococcus aureus  *  Critical Value/Significant Value, preliminary result only, " called to and read back by  Flori Oliveira RN 9/6/19 @ 6970 TF    Susceptibility testing done on previous specimen Cultured on the 1st day of incubation:  Staphylococcus aureus  *  Critical Value/Significant Value, preliminary result only, called to and read back by  Nikkie Alaniz RN 9/6/19 @ 9764 TF    (Note)  POSITIVE for STAPHYLOCOCCUS AUREUS and NEGATIVE for the mecA gene  (not MRSA) by Verigene multiplex nucleic acid test. The mecA gene was  not detected. Final identification and antimicrobial susceptibility  testing will be verified by standard methods.    Critical Value/Significant Value called to and read back by Markus Stephen RN at 0702 on 09.06.19 by mp    Specimen tested with Verigene multiplex, gram-positive blood culture  nucleic acid test for the following targets: Staph aureus, Staph  epidermidis, Staph lugdunensis, other Staph species, Enterococcus  faecalis, Enterococcus faecium, Streptococcus species, S. agalactiae,  S. anginosus grp., S. pneumoniae, S. pyogenes, Listeria sp., mecA  (methicillin resistance) and Siomara/B (vancomycin resistance).              Lab Results   Component Value Date     09/07/2019     09/06/2019     09/05/2019    Lab Results   Component Value Date    CHLORIDE 97 09/07/2019    CHLORIDE 95 09/06/2019    CHLORIDE 91 09/05/2019    Lab Results   Component Value Date    BUN 27 09/07/2019    BUN 26 09/06/2019    BUN 25 09/05/2019      Lab Results   Component Value Date    POTASSIUM 3.5 09/07/2019    POTASSIUM 3.6 09/06/2019    POTASSIUM 3.4 09/06/2019    Lab Results   Component Value Date    CO2 22 09/07/2019    CO2 27 09/06/2019    CO2 28 09/05/2019    Lab Results   Component Value Date    CR 1.06 09/07/2019    CR 1.17 09/06/2019    CR 1.18 09/05/2019        Recent Labs   Lab 09/07/19  0714 09/06/19  0632 09/05/19  1701   WBC 6.1 4.6 7.9   HGB 11.6* 12.4* 12.2*   HCT 33.8* 35.5* 35.7*   MCV 91 90 92   PLT 39* 50* 65*

## 2019-09-08 ENCOUNTER — APPOINTMENT (OUTPATIENT)
Dept: GENERAL RADIOLOGY | Facility: CLINIC | Age: 69
DRG: 871 | End: 2019-09-08
Attending: INTERNAL MEDICINE
Payer: MEDICARE

## 2019-09-08 ENCOUNTER — APPOINTMENT (OUTPATIENT)
Dept: PHYSICAL THERAPY | Facility: CLINIC | Age: 69
DRG: 871 | End: 2019-09-08
Payer: MEDICARE

## 2019-09-08 VITALS
TEMPERATURE: 96.4 F | BODY MASS INDEX: 31.3 KG/M2 | RESPIRATION RATE: 28 BRPM | OXYGEN SATURATION: 94 % | WEIGHT: 218.6 LBS | DIASTOLIC BLOOD PRESSURE: 69 MMHG | HEIGHT: 70 IN | HEART RATE: 95 BPM | SYSTOLIC BLOOD PRESSURE: 122 MMHG

## 2019-09-08 LAB
ANION GAP SERPL CALCULATED.3IONS-SCNC: 9 MMOL/L (ref 3–14)
BACTERIA SPEC CULT: ABNORMAL
BUN SERPL-MCNC: 29 MG/DL (ref 7–30)
CALCIUM SERPL-MCNC: 8.2 MG/DL (ref 8.5–10.1)
CHLORIDE SERPL-SCNC: 96 MMOL/L (ref 94–109)
CO2 SERPL-SCNC: 24 MMOL/L (ref 20–32)
CREAT SERPL-MCNC: 1.01 MG/DL (ref 0.66–1.25)
CREAT SERPL-MCNC: NORMAL MG/DL (ref 0.66–1.25)
ERYTHROCYTE [DISTWIDTH] IN BLOOD BY AUTOMATED COUNT: 13.5 % (ref 10–15)
GFR SERPL CREATININE-BSD FRML MDRD: 75 ML/MIN/{1.73_M2}
GFR SERPL CREATININE-BSD FRML MDRD: NORMAL ML/MIN/{1.73_M2}
GLUCOSE SERPL-MCNC: 96 MG/DL (ref 70–99)
HCT VFR BLD AUTO: 34.6 % (ref 40–53)
HGB BLD-MCNC: 11.6 G/DL (ref 13.3–17.7)
LACTATE BLD-SCNC: 2.4 MMOL/L (ref 0.7–2)
Lab: ABNORMAL
Lab: ABNORMAL
MCH RBC QN AUTO: 30.3 PG (ref 26.5–33)
MCHC RBC AUTO-ENTMCNC: 33.5 G/DL (ref 31.5–36.5)
MCV RBC AUTO: 90 FL (ref 78–100)
PLATELET # BLD AUTO: 44 10E9/L (ref 150–450)
POTASSIUM SERPL-SCNC: 3.5 MMOL/L (ref 3.4–5.3)
RBC # BLD AUTO: 3.83 10E12/L (ref 4.4–5.9)
SODIUM SERPL-SCNC: 129 MMOL/L (ref 133–144)
SPECIMEN SOURCE: ABNORMAL
SPECIMEN SOURCE: ABNORMAL
WBC # BLD AUTO: 8.6 10E9/L (ref 4–11)

## 2019-09-08 PROCEDURE — 36415 COLL VENOUS BLD VENIPUNCTURE: CPT | Performed by: INTERNAL MEDICINE

## 2019-09-08 PROCEDURE — 99239 HOSP IP/OBS DSCHRG MGMT >30: CPT | Performed by: INTERNAL MEDICINE

## 2019-09-08 PROCEDURE — 97530 THERAPEUTIC ACTIVITIES: CPT | Mod: GP

## 2019-09-08 PROCEDURE — 71045 X-RAY EXAM CHEST 1 VIEW: CPT

## 2019-09-08 PROCEDURE — 25000128 H RX IP 250 OP 636: Performed by: INTERNAL MEDICINE

## 2019-09-08 PROCEDURE — 87040 BLOOD CULTURE FOR BACTERIA: CPT | Performed by: INTERNAL MEDICINE

## 2019-09-08 PROCEDURE — 87077 CULTURE AEROBIC IDENTIFY: CPT | Performed by: INTERNAL MEDICINE

## 2019-09-08 PROCEDURE — 80048 BASIC METABOLIC PNL TOTAL CA: CPT | Performed by: INTERNAL MEDICINE

## 2019-09-08 PROCEDURE — 83605 ASSAY OF LACTIC ACID: CPT | Performed by: INTERNAL MEDICINE

## 2019-09-08 PROCEDURE — 97116 GAIT TRAINING THERAPY: CPT | Mod: GP

## 2019-09-08 PROCEDURE — 82565 ASSAY OF CREATININE: CPT | Performed by: INTERNAL MEDICINE

## 2019-09-08 PROCEDURE — 85027 COMPLETE CBC AUTOMATED: CPT | Performed by: INTERNAL MEDICINE

## 2019-09-08 PROCEDURE — 87800 DETECT AGNT MULT DNA DIREC: CPT | Performed by: INTERNAL MEDICINE

## 2019-09-08 PROCEDURE — 87186 SC STD MICRODIL/AGAR DIL: CPT | Performed by: INTERNAL MEDICINE

## 2019-09-08 RX ORDER — LEVOFLOXACIN 5 MG/ML
750 INJECTION, SOLUTION INTRAVENOUS EVERY 24 HOURS
DISCHARGE
Start: 2019-09-08

## 2019-09-08 RX ORDER — ACETAMINOPHEN 325 MG/1
650 TABLET ORAL EVERY 4 HOURS PRN
DISCHARGE
Start: 2019-09-08

## 2019-09-08 RX ORDER — ONDANSETRON 2 MG/ML
4 INJECTION INTRAMUSCULAR; INTRAVENOUS EVERY 6 HOURS PRN
DISCHARGE
Start: 2019-09-08

## 2019-09-08 RX ORDER — CEFAZOLIN SODIUM 2 G/100ML
2 INJECTION, SOLUTION INTRAVENOUS EVERY 8 HOURS
DISCHARGE
Start: 2019-09-08

## 2019-09-08 RX ORDER — ONDANSETRON 4 MG/1
4 TABLET, ORALLY DISINTEGRATING ORAL EVERY 6 HOURS PRN
DISCHARGE
Start: 2019-09-08

## 2019-09-08 RX ORDER — PROCHLORPERAZINE MALEATE 5 MG
5 TABLET ORAL EVERY 6 HOURS PRN
DISCHARGE
Start: 2019-09-08

## 2019-09-08 RX ADMIN — CEFAZOLIN SODIUM 2 G: 2 INJECTION, SOLUTION INTRAVENOUS at 03:20

## 2019-09-08 RX ADMIN — CHLOROTHIAZIDE SODIUM 500 MG: 500 INJECTION, POWDER, LYOPHILIZED, FOR SOLUTION INTRAVENOUS at 10:06

## 2019-09-08 RX ADMIN — CEFAZOLIN SODIUM 2 G: 2 INJECTION, SOLUTION INTRAVENOUS at 10:53

## 2019-09-08 RX ADMIN — SODIUM CHLORIDE 500 ML: 9 INJECTION, SOLUTION INTRAVENOUS at 06:54

## 2019-09-08 RX ADMIN — LEVOFLOXACIN 750 MG: 5 INJECTION, SOLUTION INTRAVENOUS at 11:39

## 2019-09-08 ASSESSMENT — MIFFLIN-ST. JEOR: SCORE: 1772.81

## 2019-09-08 ASSESSMENT — ACTIVITIES OF DAILY LIVING (ADL)
ADLS_ACUITY_SCORE: 13

## 2019-09-08 NOTE — DISCHARGE SUMMARY
Discharge Summary    Eh Srivastava MRN# [id number]   YOB: 1950 Age: 69 year old     Date of Admission:  9/5/2019  Date of Discharge:  9/8/2019  Admitting Physician:  Vicente Longo MD  Discharge Physician:  Vicente Longo MD  Discharging Service:  Hospitalist       Primary Provider: Cristi Cherry          Discharge Diagnosis:   1.  Sepsis with MSSA bacteremia and gram negative prashanth bacteremia, most likely due to endocarditis.      2. 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    3. H/o mitral valve repair surgery    4. History of pacemaker placement 5/14/18 ([employer] Solara CRT-P MRI SureScan model number [id number])    5.  Diffuse myalgia and right shoulder pain. Suspect reactive to bacteremia. Monitor. Consider Orthopedic surgery evaluation if not improving.      6.  Lactic acidosis, due to sepsis.      7.  Dehydration with rising creatinine on admission.  Improved with IVF hydration.    8.  Mild volume overload after IVF resuscitation for sepsis. Diuril was given on 9/8/19 (Furosemide allergy).     9.  Hyponatremia, modest     10.  Hypokalemia.  Replaced per protocol.     11.  Mild rhabdomyolysis.  Resolved with IV fluid.      12.  Acute kidney injury on admissin, mild, resolved with IVF.      13.  Thrombocytopenia, likely related to sepsis. Peripheral smear suggested the same (reactive). Continue to avoid anticoagulants for DVT prophylaxis. Continue to hold PTA Elliquis for now.      14. Infectious and metabolic encephalopathy. Improved.                  Discharge Disposition:   Discharged to [facility]           Allergies:   Allergies   Allergen Reactions     Amlodipine Shortness Of Breath     Atenolol Shortness Of Breath     Dofetilide Other (See Comments)     Prolonged QT     Lisinopril Cough     Losartan Other (See Comments)     Weight gain     Furosemide Rash     Metoprolol  "Rash                Condition on Discharge:   Discharge condition: Stable   Discharge vitals: Blood pressure 122/69, pulse 95, temperature 96.4  F (35.8  C), temperature source Oral, resp. rate 28, height 1.778 m (5' 10\"), weight 99.2 kg (218 lb 9.6 oz), SpO2 94 %.   Code status on discharge: Full Code   Physical exam on day of discharge:   GENERAL:  Comfortable. Cooperative.  PSYCH: pleasant, oriented, No acute distress.  EYES: PERRLA, Normal conjunctiva.  HEART:  Regular rate and rhythm. No JVD. Pulses normal. No edema.  LUNGS:  Clear to auscultation, normal Respiratory effort.  ABDOMEN:  Soft, no hepatosplenomegaly, normal bowel sounds.  EXTREMETIES: No clubbing, cyanosis or ischemia. Upper extremity myalgia with pain and subjective weakness of arms and right shoulder  SKIN:  Dry to touch, No rash.         History of Present Illness and Hospital Course:     See detailed admission note for full details.  Eh Srivastava is a 69-year-old male with history of hypertension, hernia, heart murmur, atrial fibrillation, pacemaker placement in 5/18, hernia surgery, tonsillectomy, mitral valve repair, and mandible surgery.  He presented to the emergency department  at [facility] on 9/5/19 for evaluation of fever, fatigue, diffuse myalgias, and poor oral intake.  These symptoms had been progressive over the previous 2 days.  He had been seen in the [facility] one day before and evaluation was largely unremarkable- including basic metabolic panel, CBC (platelets were low at 85), urinalysis, and chest Xray.  He discharged home from the [facility].  He continued to feel poorly so came to the  Emergency Department at [facility] the next day (9/5/19).      Work-up in the ED on 9/5/19 showed stable vital signs.  Exam suggested some mild confusion with slowness to answer questions but neuro exam was non-focal. Eh did have diffuse myalgias involving his back, both legs, arms, and shoulders.  His family had noted " what looked like a possible insect bite on his left medial lower leg. No tick was noted. Laboratory evaluation showed worsening thrombocytopenia with platelet count of 65, hyponatremia with sodium of 127, hypokalemia with potassium 3.1, rhabdomyolysis with CK of 1106, and an elevated C-reactive protein of 173.  Creatinine was also higher than the day before at 1.18.  Infectious disease was consulted in the ED by phone and recommended checking a parasite smear for ehrlichiosis and babesiosis as well as a lyme screen (both were ultimately negative). Eh was admitted to the hospital with febrile illness for which he was failing outpatient management with worsening labs and dehydration.     Empiric antibiotics were not started. After admission, 2 of 2 blood cultures grew what was ultimately identified as MSSA. Rocephin and Vancomycin were started overnight after admission. ID was consulted and saw Eh the following day, changing Rocephin to Ancef and stopping Vancomycin once testing showed MSSA. Fevers and diffuse myaglias including lower mid back pain persisted. TTE was obtained on 9/6/19 with concerns about endocarditis- this was unremarkable. RADHA was planned for Monday 9/9/19. On 9/7 repeat blood cultures that were obtained on 9/6/19 grew gram positive cocci and gram negative rods. IV Levaquin was added for gram negative coverage (identification is pending). Given persistent lower back pain imaging of spine was pursued. We are not able to do MRI's on patients with pacemakers at this facility, so Lumbar spine CT with contrast was obtained and was unremarkable (no discitis or abscess). Given ongoing upper extremity pain and weakness as well as some confusion Infectious Disease recommended obtaining CT of head, cervical spine, and thoracic spine. Given contrast study earlier in the day (lumbar spine CT), these were done without IV contrast. CT's of cervical spine and thoracic spine showed no signs of discitis,  abscess, canal narrowing, or foraminal narrowing.  They did note some infiltrate in lungs raising question of possible pneumonia. Consideration of CT of chest at some point was recommended, if clinically indicated. CXR was obtained and suggested volume overload and atelectasis. One dose of IV diuril (furosemide allergy) was given on the morning of 9/8/19. CT of head raised question of posterior right frontal lobe lesion- possibly infectious with possible small subarachnoid hemorrhage. This result was discussed with Radiology and CT of head with and without contrast was recommended. This was obtained urgently and suggested cortical vein thrombosis with possible tiny subarachnoid hemorrhage and associated edema versus lesion of infectious etiology. MRI was recommended for further evaluation. Eh's pacemaker device seems to be MRI compatible, but we are not able to perform MRI's on patient's with pacemakers here. Further, with findings on CT of head, Neurosurgery evaluation (not available at this hospital) is likely indicated to assess this abnormality and advise regarding appropriate treatment. There is also a high likelihood that Eh will ultimately need Cardiac Surgery consultation (not available here) for his suspected endocarditis in the setting of prior mitral valve repair and pacemaker placement. Because of these factors, I recommended transfer to a higher level of care to Eh. He preferred to transfer to [facility] where he has followed with the heart center, if possible. I spoke with Dr. Senia Alexander who graciously accepted this patient in transfer.     Eh will be transferring shortly. IV Ancef and Levaquin are being continued. I am requesting that imaging studies be saved to disc and sent with patient. MRI of brain with pacemaker protocol, Neurosurgery consultation, transesophageal echocardiogram, and Infectious Disease consultation are all recommended. Cardiac surgery consultation will likely be  needed after RADHA has been done. If shoulder and upper extremity pain and weakness persist Orthopedic Surgery consultation could be considered to assess for septic arthritis. Note that Xrays of both shoulders and left wrist were obtained and were unremarkable.               Procedures / Imaging:   CT of head non contrast  CT of head with and without contrast  CT of cervical and thoracic spine non contrast  CT of lumbar spine with contrast  Transthoracic echocardiogram  XR of chest  XR of bilateral shoulders  XR of left wrist           Consultations:   Consultation during this admission received from infectious disease and orthopedics (patient was not seen by Ortho due to pending transfer)             Pending Results:   Final blood culture results           Discharge Instructions and Follow-Up:   Discharge diet: Regular   Discharge activity: Activity as tolerated   Discharge follow-up: Care to be assumed by Dr. Senia Alexander at [facility]   Outpatient therapy: None    Other instructions: None             Discharge Medications:   Current Discharge Medication List      START taking these medications    Details   ceFAZolin (ANCEF) intermittent infusion 2 g in 100 mL dextrose PRE-MIX Inject 100 mLs (2 g) into the vein every 8 hours    Associated Diagnoses: Acute endocarditis, unspecified endocarditis type      levofloxacin (LEVAQUIN) 750 MG/150ML infusion Inject 150 mLs (750 mg) into the vein every 24 hours    Associated Diagnoses: Acute endocarditis, unspecified endocarditis type      ondansetron (ZOFRAN) 2 MG/ML SOLN injection Inject 2 mLs (4 mg) into the vein every 6 hours as needed for nausea or vomiting    Associated Diagnoses: Nausea      ondansetron (ZOFRAN-ODT) 4 MG ODT tab Take 1 tablet (4 mg) by mouth every 6 hours as needed for nausea or vomiting    Associated Diagnoses: Nausea      prochlorperazine (COMPAZINE) 5 MG tablet Take 1 tablet (5 mg) by mouth every 6 hours as needed for vomiting    Associated  Diagnoses: Nausea         CONTINUE these medications which have CHANGED    Details   acetaminophen (TYLENOL) 325 MG tablet Take 2 tablets (650 mg) by mouth every 4 hours as needed for mild pain    Associated Diagnoses: Pain; Febrile illness, acute         STOP taking these medications       apixaban ANTICOAGULANT (ELIQUIS) 5 MG tablet Comments:   Reason for Stopping:         olmesartan-hydrochlorothiazide (BENICAR) 20-12.5 MG tablet Comments:   Reason for Stopping:                  Total time spent in face to face contact with the patient and coordinating discharge was:  75 Minutes

## 2019-09-08 NOTE — PLAN OF CARE
Patient transferred to [place] via [facility] at this time. Medical records send with transport. Patients wife left with all patients belongings.

## 2019-09-08 NOTE — PROGRESS NOTES
[facility]    Sepsis Evaluation Progress Note    Date of Service: 09/08/2019    I was called to see Eh Srivastava due to abnormal vital signs triggering the Sepsis SIRS screening alert. He is known to have an infection.     Physical Exam    Vital Signs:  Temp: 99.3  F (37.4  C) Temp src: Oral BP: 111/75 Pulse: 88   Resp: 28 SpO2: 95 % O2 Device: None (Room air) Oxygen Delivery: 1.5 LPM    Lab:  Lactic Acid   Date Value Ref Range Status   09/07/2019 2.5 (H) 0.7 - 2.0 mmol/L Final     Lactate for Sepsis Protocol   Date Value Ref Range Status   09/08/2019 2.4 (H) 0.7 - 2.0 mmol/L Final     Comment:     Significant value called to and read back by  GLENNY ANTHONY RN ([id number]) ON 09/08/2019 AT 06:05 BY DNT         The patient is at baseline mental status.     Assessment and Plan    The SIRS and exam findings are likely due to   sepsis.     ID: The patient is currently on the following antibiotics:  Anti-infectives (From now, onward)    Start     Dose/Rate Route Frequency Ordered Stop    09/07/19 1130  levofloxacin (LEVAQUIN) infusion 750 mg     Note to Pharmacy:  Pharmacy can adjust dose based on renal function.    750 mg  100 mL/hr over 90 Minutes Intravenous EVERY 24 HOURS 09/07/19 1125      09/06/19 1115  ceFAZolin (ANCEF) intermittent infusion 2 g in 100 mL dextrose PRE-MIX      2 g  200 mL/hr over 30 Minutes Intravenous EVERY 8 HOURS 09/06/19 1107          Current antibiotic coverage is appropriate for source of infection.    Fluid: Fluid bolus ordered.    Lab: Repeat lactic acid is not indicated.    Disposition: The patient will remain on the current unit. We will continue to monitor this patient closely.    Mandie Robertson MD, MD

## 2019-09-08 NOTE — CONSULTS
Chart reviewed  Xrays negative at wrist and shoulders    I spoke w Dr Longo who does not feel that there are localizing signs of infection and asked to hold on the consult as the patient is transferring to another facility.    Please re-consult if needed

## 2019-09-08 NOTE — PLAN OF CARE
Pt A&O, Vitals monitored.  Up with Ax2, walker to stand or jovanni steady to ambulate.  Is more awake compared to Fri felisha shift and is able to converse more appropriately.   Max temp 101.2, tylenol administered.  CT completed this shift, ortho ordering xrays shoulder/wrist as well - pending now.  Voiding adequate amounts.  IV infusing.  UA sent.  Will monitor.

## 2019-09-08 NOTE — CONSULTS
Consulted tonight  Informed by RN that eval tomorrow is appropriate per PMD  Apparent multi-organism bacteremia w low platelets and multi joint pain.  Will see in am specifically for L wrist and R shoulder  Agree w CT plan for back    I will see in am

## 2019-09-08 NOTE — PLAN OF CARE
Discharge Planner PT   Patient plan for discharge: not stated  Current status: Pt received in bed eating with spouse assisting, agreeable to PT and to get up to chair to finish eating meal. Performed supine>sit with HOB elevated and ModA x1, sit>stand with MaxAx1 and FWW. Ambulated 20' in room with ModA and FWW with 2nd assist to manage IV pole, needs assist to manage FWW. Performed sit<>stand x2 reps from chair with MaxA and FWW. Able to scoot back in chair with SBA. Pt sitting up in chair with call light in reach, spouse in room, RN aware upon departure of PT.  Barriers to return to prior living situation: currently Ax 1-2 for mobility, profound weakness, medical status  Recommendations for discharge: TCU  Rationale for recommendations: Patient profoundly weak and well below baseline mobility. Patient will benefit from continued skilled therapy to progress strength, safety and independence with mobility prior to returning home.     Physical Therapy Discharge Summary    Reason for therapy discharge:    Transferred to [place]    Progress towards therapy goal(s). See goals on Care Plan in [facility] record for goal details.  Goals not met.  Barriers to achieving goals:   Transfer from facility.    Therapy recommendation(s):    Continued therapy is recommended.  Rationale/Recommendations:  Patient will benefit from continued daily therapy in acute setting and pending progress will likely need continued rehab in IP setting prior to returning home..           Entered by: Jaqueline Hwang 09/08/2019 10:42 AM

## 2019-09-08 NOTE — PLAN OF CARE
Orientation: Alert and oriented x 4  VSS. 95% on RA.   HR 88.   LS: clear and equla, but shallow and fast. RR 28  GI:  Passing gas. No BM. Denies N/V.   : Adequate urine output.   Skin: clean and dry with some diaphoresis and bruising. Pts upper extremities are puffy from fluid overload.  Activity: 2 assist. GB walker. Pt slept comfortably throughout shift.   Pain:  0/10. Denies  Plan: Continue with current cares. Hx of HTN, pacemaker. Pt is on levoquin and ancef ABX. Triggered sepsis overnight. Lactic 2.4. Started NS bolus 500 ml @ 250 ml/hr. Continue to monitor.

## 2019-09-08 NOTE — PROGRESS NOTES
Cross coverage: Patient signed out to me by my colleague, Dr. Vicente Longo.  Chart also reviewed.  Patient initially presented here with fever, fatigue, diffuse myalgias, and poor p.o. intake.  Significant complex past medical history which includes A. fib status post pacemaker placement, mitral valve repair, hypertension, and previous mandibular surgery.  Since admission, patient did grow out MSSA in his blood and now gram-negative rods.  Patient currently on Ancef and Levaquin was added per ID recommendations with positive gram-negative rods.  I did speak with [facility] radiology regarding CT of the head with contrast as patient has a history of pacemaker and not sure if this is MR-compatible.  In any event, they did note that there was some right frontal subcortical low-attenuation white matter near the vertex with curvilinear density in the adjacent sulcus with probable associated subarachnoid hemorrhage.  Findings are somewhat nonspecific but differential diagnosis does include possible cortical vein thrombosis with associated trace subarachnoid hemorrhage and venous edema.  Possible infectious etiology such as cerebritis was discussed but no significant enhancing capsule to suggest an abscess.  I also spoke with neurosurgery on call, Severino Ferrell PA-C.  Findings are nonspecific and no indications for urgent neurosurgical intervention.  Continue on IV antibiotics and would only recommend discussing with cardiology whether patient's pacemaker is MRI compatible to further characterize CT of the head with contrast findings in the morning.  Continue current IV antibiotics or as directed by infectious disease.  My colleague will follow up with the patient in the morning.

## 2019-09-08 NOTE — PROGRESS NOTES
Earlier I reviewed the infectious disease note from earlier in the day.  It was recommended that CT scan of head, C-spine, thoracic spine, and lumbar spine be obtained.  CT scan of lumbar spine had already been obtained earlier.  I obtained noncontrast CTs of head, cervical spine, and thoracic spine.  I received a page to call radiology.  There was concern about the CT scan of the head.  There was a question of possible vasogenic edema in the posterior frontal lobe.  I spoke with Dr. Daly.  He recommended obtaining repeat CT of head with and without contrast to further evaluate (of note, with the patient's pacemaker he is not able to have an MRI).  I did order the CT.  There are no worrisome spinal findings on the images of the cervical and thoracic spine.  There was a question of a possible pneumonia on the C-spine CT.  Consideration CT of chest at some point was recommended.  Eh has had multiple CT images today.  He is volume up about 5 L.  I wonder if these findings are more related to volume overload.  He is pretty well covered for pneumonia with current antibiotics (Levaquin and Ancef).  I will hold off on CT of chest for now.  I have stopped IV fluids.  He could probably receive a little bit of IV Lasix later if needed.  I will obtain chest x-ray in the morning to evaluate fluid overload versus pneumonia.

## 2019-09-09 LAB
BACTERIA SPEC CULT: ABNORMAL
Lab: ABNORMAL
SPECIMEN SOURCE: ABNORMAL

## 2019-09-10 ENCOUNTER — TELEPHONE (OUTPATIENT)
Facility: CLINIC | Age: 69
End: 2019-09-10

## 2019-09-10 NOTE — TELEPHONE ENCOUNTER
Received call from [facility]  Pt has 1/4 blood culture with [facility] in cluster on 9/8.  PT was transferred to [place] on 9/8 and has been treated there for endocarditis with MSSA bacteremia.  Further care and work up as per [place].

## 2019-09-12 LAB
BACTERIA SPEC CULT: ABNORMAL
Lab: ABNORMAL
SPECIMEN SOURCE: ABNORMAL

## 2019-09-13 LAB
BACTERIA SPEC CULT: NO GROWTH
BACTERIA SPEC CULT: NO GROWTH
Lab: NORMAL
Lab: NORMAL
SPECIMEN SOURCE: NORMAL
SPECIMEN SOURCE: NORMAL

## 2019-09-14 LAB
BACTERIA SPEC CULT: NO GROWTH
Lab: NORMAL
SPECIMEN SOURCE: NORMAL

## 2019-09-16 ENCOUNTER — RECORDS - HEALTHEAST (OUTPATIENT)
Dept: RADIOLOGY | Facility: CLINIC | Age: 69
End: 2019-09-16

## 2020-01-14 ENCOUNTER — RECORDS - HEALTHEAST (OUTPATIENT)
Dept: LAB | Facility: CLINIC | Age: 70
End: 2020-01-14

## 2020-01-14 LAB
CHOLEST SERPL-MCNC: 120 MG/DL
FASTING STATUS PATIENT QL REPORTED: YES
HDLC SERPL-MCNC: 40 MG/DL
LDLC SERPL CALC-MCNC: 65 MG/DL
TRIGL SERPL-MCNC: 74 MG/DL

## 2020-07-26 NOTE — ED PROVIDER NOTES
History     Chief Complaint:  Fatigue    HPI   Eh Srivastava is a 69 year old male, with a history of heart murmer and hypertension, who has a pacemaker, who presents to the ED for evaluation of fatigue. The patient states that he has been experiencing fatigue, increased sleep and intermittent episodes of nausea, decreased urination, chills, bilateral myalgias of upper and lower body, low grade fever, and decreased appetite. The patient presented to the urgency room yesterday where he had labs, urinalysis and imaging done (results below), but states he continued to feel unwell. Today the patient's family noticed an insect bite on his right lower leg, and that the patient had increased abdominal bloating. The patient reports bilateral leg weakness and unsteadiness, which is preventing him from being able to get to the bathroom to urinate. The patient denies any vomiting, diarrhea, abdominal pain, cough, chest pain or recent sick contacts. He also denies any recent travel.    UR Course 9/4/2019  Laboratory:  BMP: Creat 1.00 (WNL), Sodium 134 (low), Chloride 97 (low), Glucose 228 (high), BUN 25 (high), B/C Ratio 25 (high) o/w WNL   CBC: WBC 6.5 (WNL), HGB 13.5 (WNL), PLT 85 (low), MCH 32.0 (high)    UA: Clarity Slightly Cloudy (A), SP GR >=1.030 (A), Protein 100 (A), Glucose 100 (A), Bili (A), Occ Bld Small (A) and RBC 3-5 (A) o/w Negative    Imaging:   XR Chest 2 Views (PA and Lateral): No consolidation. No pleural effusions. No pneumothorax. Unchanged cardiac size. Median sternotomy. Left-sided dual-lead cardiac rhythm maintenance device. Reading per radiology.    Allergies:  Amlodipine, shortness of breath  Atenolol, shortness of breath  Dofetilide, prolonged QT  Lisinopril, cough  Losartan, weight gain  Furosemide, rash  Metoprolol, rash     Medications:    325 mg Aspirin  Cozaar  Oxycodone    Past Medical History:    Arrhythmia  Heart murmur  Hernia  Hypertension   Pacemaker     Past Surgical History:   "  Mitral valve repair  Tonsillectomy  Herniorrhaphy inguinal  Mandible surgery  Insert pacemaker    Family History:    Cancer, mother  Thyroid disease, mother    Social History:  Former smoker, quit 12/31/1983.  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  Negative for drug use.  Marital Status:   [2]     Review of Systems   Constitutional: Positive for appetite change, chills, diaphoresis, fatigue and fever.   Respiratory: Negative for cough.    Cardiovascular: Negative for chest pain.   Gastrointestinal: Positive for abdominal distention and nausea. Negative for abdominal pain, diarrhea and vomiting.   Genitourinary: Positive for decreased urine volume and difficulty urinating.   Musculoskeletal: Positive for gait problem and myalgias.   Skin:        Insect bite   Neurological: Positive for weakness.   Psychiatric/Behavioral:        Increased sleep   All other systems reviewed and are negative.    Physical Exam     Patient Vitals for the past 24 hrs:   BP Temp Temp src Pulse Resp SpO2 Height Weight   09/05/19 1915 101/66 -- -- 75 -- 96 % -- --   09/05/19 1900 107/70 -- -- 74 -- 96 % -- --   09/05/19 1845 102/60 -- -- 71 -- 96 % -- --   09/05/19 1830 111/64 -- -- 69 -- 99 % -- --   09/05/19 1815 108/65 -- -- 71 -- 100 % -- --   09/05/19 1800 106/65 -- -- 63 -- 97 % -- --   09/05/19 1745 99/66 -- -- 69 -- 96 % -- --   09/05/19 1730 104/64 -- -- 68 -- 98 % -- --   09/05/19 1715 102/53 -- -- -- -- -- -- --   09/05/19 1504 101/60 98.8  F (37.1  C) Temporal 91 18 95 % 1.778 m (5' 10\") 88.5 kg (195 lb)     Physical Exam  General: Patient is alert and cooperative.  HENT:  Normal nose, oropharynx. Moist oral mucosa.  Eyes: EOMI. Normal conjunctiva.  Neck:  Normal range of motion and appearance. No meningismus.   Cardiovascular:  Normal rate, regular rhythm and normal heart sounds.   Pulmonary/Chest:  Effort normal. No wheezing or crackles.  Abdominal: Soft. No distension or tenderness.     Musculoskeletal: Normal " No pertinent past medical history range of motion. No edema or tenderness.   Neurological: oriented, normal strength, sensation, and coordination.   Skin: Warm and dry. No rash or bruising. Raised, slightly erythematous lesion, possible insect bite, left superomedial lower leg; no cellulitis.   Psychiatric: Normal mood and affect. Normal behavior and judgement.      Emergency Department Course   Laboratory:  CBC: WBC: 7.9, HGB: 12.2 (L), PLT: 65 (L)  CMP: Glucose 123 (H), NA: 127 (L), Potassium: 3.1 (L), Chloride: 91 (L), Calcium: 8.0 (L), Bilirubin: 1.4 (H), Albumin: 2.7 (L), Protein total: 6.0 (L), AST: 79 (H), o/w WNL (Creatinine: 1.18)  CK total: 1,106 (H) - critical value  CRP inflammation: 173.0 (H)  Blood culture: pending  Blood culture: pending  Alcohol level blood: <0.01  Lyme disease Lynn with reflex to WB serum: pending  Parasite stain: pending    Interventions:  184 K-dur 40 mEq PO   Tylenol tablet 1000 mg PO   Sodium chloride 0.9% infusion    Emergency Department Course:  Nursing notes and vitals reviewed. (163) I performed an exam of the patient as documented above.     IV inserted. Medicine administered as documented above. Blood drawn. This was sent to the lab for further testing, results above.     173 I sent out a page for Infectious Disease.    1746 I spoke with Dr. Davis of Infectious Disease on the phone.    183 I rechecked the patient and discussed the results of his workup thus far.     Findings and plan explained to the Patient who consents to admission.     : Discussed the patient with Dr. Longo, who will admit the patient to an inpatient medical bed for further monitoring, evaluation, and treatment.     Impression & Plan    Medical Decision Makin-year-old male arrives for further evaluation of acute subjective fevers and chills with intense extremity myalgias, leg weakness and difficulty walking.  Is also had some anorexia but no headache or neck discomfort.  Symptoms began several days ago.  There is been  no recent travel history outside of the [place] metro area.  He is immunocompetent with no ill contacts.  Physical examination is essentially unremarkable.  Is mentating normally has no meningismus chest or abdominal tenderness.  He has normal range of motion of his extremities and no significant skin rash.  There is a small patch of slightly raised erythema of his left lower leg which family thinks may be an insect bite.  There are no petechia or purpura.  Laboratory tests include a normal white blood cell count of 7.9.  He does have thrombocytopenia which is new.  His platelet count is 65.  A comprehensive metabolic panel shows sodium of 127, potassium 3.1, he has very slight elevation of his AST and bilirubin.  His CK is 1106 and his CRP is 173.  I have obtained a set of blood cultures and sent off tests for Lyme disease before obtaining an infectious disease consultation.  Based upon his symptoms and thrombocytopenia they recommended a parasite stain for ehrlichiosis and babesiosis.  Etiology at this point is undetermined.  Appears to have an acute infectious process of some sort and he feels uncomfortable returning home given now difficult it is for him to walk.  His gait was not assessed in the emergency department therefore arrangements have been made for admission to the hospitalist service.  He was medicated with a gram of Tylenol and 40 mEq of potassium.  I do not believe empiric antibiotics are necessary at this time.  There is no concern for meningitis encephalitis.  Further work-up is deferred to the inpatient service with appropriate consultations as needed.  Diagnosis:    ICD-10-CM    1. Acute febrile illness R50.9 CBC with platelets differential     Comprehensive metabolic panel     CK total     CRP inflammation     Blood culture     Blood culture     Alcohol level blood     Parasite stain   2. Generalized muscle weakness M62.81        Disposition:  The patient was admitted.    Gavin  Disclosure:  I, Lynette Jacobs, am serving as a scribe on 9/5/2019 at 4:37 PM to personally document services performed by Addy Mercedes MD based on my observations and the provider's statements to me.     Lynette Jacobs  9/5/2019   [facility] EMERGENCY DEPARTMENT       Addy Mercedes MD  09/05/19 1791

## 2025-08-18 ENCOUNTER — MEDICAL CORRESPONDENCE (OUTPATIENT)
Dept: HEALTH INFORMATION MANAGEMENT | Facility: CLINIC | Age: 75
End: 2025-08-18
Payer: MEDICARE

## 2025-08-18 ENCOUNTER — TRANSCRIBE ORDERS (OUTPATIENT)
Dept: GASTROENTEROLOGY | Facility: CLINIC | Age: 75
End: 2025-08-18
Payer: MEDICARE

## 2025-08-18 DIAGNOSIS — Z00.00 ENCOUNTER FOR SCREENING AND PREVENTATIVE CARE: Primary | ICD-10-CM
